# Patient Record
Sex: FEMALE | Race: BLACK OR AFRICAN AMERICAN | Employment: OTHER | URBAN - METROPOLITAN AREA
[De-identification: names, ages, dates, MRNs, and addresses within clinical notes are randomized per-mention and may not be internally consistent; named-entity substitution may affect disease eponyms.]

---

## 2017-09-20 ENCOUNTER — APPOINTMENT (OUTPATIENT)
Dept: GENERAL RADIOLOGY | Age: 82
End: 2017-09-20
Attending: FAMILY MEDICINE
Payer: MEDICARE

## 2017-09-20 ENCOUNTER — HOSPITAL ENCOUNTER (OUTPATIENT)
Age: 82
Setting detail: OBSERVATION
Discharge: HOME | End: 2017-09-23
Attending: FAMILY MEDICINE | Admitting: INTERNAL MEDICINE
Payer: MEDICARE

## 2017-09-20 DIAGNOSIS — R07.9 ACUTE CHEST PAIN: Primary | ICD-10-CM
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
## 2017-09-20 PROBLEM — S22.39XA RIB FRACTURE: Status: ACTIVE | Noted: 2017-09-20

## 2017-09-20 LAB
ANION GAP SERPL CALC-SCNC: 8 MMOL/L (ref 3–18)
BASOPHILS # BLD: 0 K/UL (ref 0–0.06)
BASOPHILS NFR BLD: 0 % (ref 0–2)
BNP SERPL-MCNC: 402 PG/ML (ref 0–1800)
BUN SERPL-MCNC: 11 MG/DL (ref 7–18)
BUN/CREAT SERPL: 11 (ref 12–20)
CALCIUM SERPL-MCNC: 9.4 MG/DL (ref 8.5–10.1)
CHLORIDE SERPL-SCNC: 101 MMOL/L (ref 100–108)
CK MB CFR SERPL CALC: 0.9 % (ref 0–4)
CK MB SERPL-MCNC: 1 NG/ML (ref 5–25)
CK SERPL-CCNC: 113 U/L (ref 26–192)
CO2 SERPL-SCNC: 28 MMOL/L (ref 21–32)
CREAT SERPL-MCNC: 1.01 MG/DL (ref 0.6–1.3)
DIFFERENTIAL METHOD BLD: ABNORMAL
EOSINOPHIL # BLD: 0.1 K/UL (ref 0–0.4)
EOSINOPHIL NFR BLD: 2 % (ref 0–5)
ERYTHROCYTE [DISTWIDTH] IN BLOOD BY AUTOMATED COUNT: 14.8 % (ref 11.6–14.5)
GLUCOSE BLD STRIP.AUTO-MCNC: 157 MG/DL (ref 70–110)
GLUCOSE BLD STRIP.AUTO-MCNC: 56 MG/DL (ref 70–110)
GLUCOSE BLD STRIP.AUTO-MCNC: 63 MG/DL (ref 70–110)
GLUCOSE SERPL-MCNC: 79 MG/DL (ref 74–99)
HCT VFR BLD AUTO: 38.9 % (ref 35–45)
HGB BLD-MCNC: 13.4 G/DL (ref 12–16)
INR PPP: 0.9 (ref 0.8–1.2)
LYMPHOCYTES # BLD: 2.4 K/UL (ref 0.9–3.6)
LYMPHOCYTES NFR BLD: 31 % (ref 21–52)
MCH RBC QN AUTO: 27.9 PG (ref 24–34)
MCHC RBC AUTO-ENTMCNC: 34.4 G/DL (ref 31–37)
MCV RBC AUTO: 81 FL (ref 74–97)
MONOCYTES # BLD: 0.7 K/UL (ref 0.05–1.2)
MONOCYTES NFR BLD: 9 % (ref 3–10)
NEUTS SEG # BLD: 4.3 K/UL (ref 1.8–8)
NEUTS SEG NFR BLD: 58 % (ref 40–73)
PLATELET # BLD AUTO: 230 K/UL (ref 135–420)
PMV BLD AUTO: 9.9 FL (ref 9.2–11.8)
POTASSIUM SERPL-SCNC: 3.2 MMOL/L (ref 3.5–5.5)
PROTHROMBIN TIME: 11.9 SEC (ref 11.5–15.2)
RBC # BLD AUTO: 4.8 M/UL (ref 4.2–5.3)
SODIUM SERPL-SCNC: 137 MMOL/L (ref 136–145)
TROPONIN I SERPL-MCNC: 0.14 NG/ML (ref 0–0.06)
WBC # BLD AUTO: 7.5 K/UL (ref 4.6–13.2)

## 2017-09-20 PROCEDURE — 74011250636 HC RX REV CODE- 250/636: Performed by: FAMILY MEDICINE

## 2017-09-20 PROCEDURE — 96374 THER/PROPH/DIAG INJ IV PUSH: CPT

## 2017-09-20 PROCEDURE — 80048 BASIC METABOLIC PNL TOTAL CA: CPT | Performed by: FAMILY MEDICINE

## 2017-09-20 PROCEDURE — 74011250637 HC RX REV CODE- 250/637: Performed by: INTERNAL MEDICINE

## 2017-09-20 PROCEDURE — 85610 PROTHROMBIN TIME: CPT | Performed by: FAMILY MEDICINE

## 2017-09-20 PROCEDURE — 74011250636 HC RX REV CODE- 250/636: Performed by: INTERNAL MEDICINE

## 2017-09-20 PROCEDURE — 82962 GLUCOSE BLOOD TEST: CPT

## 2017-09-20 PROCEDURE — 83880 ASSAY OF NATRIURETIC PEPTIDE: CPT | Performed by: FAMILY MEDICINE

## 2017-09-20 PROCEDURE — 74011000250 HC RX REV CODE- 250: Performed by: INTERNAL MEDICINE

## 2017-09-20 PROCEDURE — 96375 TX/PRO/DX INJ NEW DRUG ADDON: CPT

## 2017-09-20 PROCEDURE — 85025 COMPLETE CBC W/AUTO DIFF WBC: CPT | Performed by: FAMILY MEDICINE

## 2017-09-20 PROCEDURE — 71010 XR CHEST PORT: CPT

## 2017-09-20 PROCEDURE — 96372 THER/PROPH/DIAG INJ SC/IM: CPT

## 2017-09-20 PROCEDURE — 74011250637 HC RX REV CODE- 250/637: Performed by: FAMILY MEDICINE

## 2017-09-20 PROCEDURE — 82550 ASSAY OF CK (CPK): CPT | Performed by: FAMILY MEDICINE

## 2017-09-20 PROCEDURE — 93005 ELECTROCARDIOGRAM TRACING: CPT

## 2017-09-20 PROCEDURE — 99285 EMERGENCY DEPT VISIT HI MDM: CPT

## 2017-09-20 RX ORDER — MAGNESIUM SULFATE 100 %
4 CRYSTALS MISCELLANEOUS AS NEEDED
Status: DISCONTINUED | OUTPATIENT
Start: 2017-09-20 | End: 2017-09-23 | Stop reason: HOSPADM

## 2017-09-20 RX ORDER — METOLAZONE 5 MG/1
5 TABLET ORAL AS NEEDED
COMMUNITY

## 2017-09-20 RX ORDER — NAPROXEN 250 MG/1
500 TABLET ORAL 2 TIMES DAILY WITH MEALS
Status: DISCONTINUED | OUTPATIENT
Start: 2017-09-21 | End: 2017-09-23 | Stop reason: HOSPADM

## 2017-09-20 RX ORDER — TRAZODONE HYDROCHLORIDE 50 MG/1
25 TABLET ORAL ONCE
Status: COMPLETED | OUTPATIENT
Start: 2017-09-20 | End: 2017-09-20

## 2017-09-20 RX ORDER — SODIUM CHLORIDE 9 MG/ML
75 INJECTION, SOLUTION INTRAVENOUS CONTINUOUS
Status: DISPENSED | OUTPATIENT
Start: 2017-09-20 | End: 2017-09-21

## 2017-09-20 RX ORDER — ONDANSETRON 2 MG/ML
4 INJECTION INTRAMUSCULAR; INTRAVENOUS ONCE
Status: COMPLETED | OUTPATIENT
Start: 2017-09-20 | End: 2017-09-20

## 2017-09-20 RX ORDER — HEPARIN SODIUM 5000 [USP'U]/ML
5000 INJECTION, SOLUTION INTRAVENOUS; SUBCUTANEOUS EVERY 8 HOURS
Status: DISCONTINUED | OUTPATIENT
Start: 2017-09-20 | End: 2017-09-23 | Stop reason: HOSPADM

## 2017-09-20 RX ORDER — MORPHINE SULFATE 4 MG/ML
4 INJECTION, SOLUTION INTRAMUSCULAR; INTRAVENOUS
Status: COMPLETED | OUTPATIENT
Start: 2017-09-20 | End: 2017-09-20

## 2017-09-20 RX ORDER — MORPHINE SULFATE 2 MG/ML
2 INJECTION, SOLUTION INTRAMUSCULAR; INTRAVENOUS ONCE
Status: COMPLETED | OUTPATIENT
Start: 2017-09-20 | End: 2017-09-20

## 2017-09-20 RX ORDER — INSULIN GLARGINE 100 [IU]/ML
15 INJECTION, SOLUTION SUBCUTANEOUS
Status: DISCONTINUED | OUTPATIENT
Start: 2017-09-20 | End: 2017-09-21

## 2017-09-20 RX ORDER — POTASSIUM CHLORIDE 1.5 G/1.77G
20 POWDER, FOR SOLUTION ORAL 2 TIMES DAILY
COMMUNITY

## 2017-09-20 RX ORDER — ACETAMINOPHEN 325 MG/1
650 TABLET ORAL
Status: DISCONTINUED | OUTPATIENT
Start: 2017-09-20 | End: 2017-09-23 | Stop reason: HOSPADM

## 2017-09-20 RX ORDER — MECLIZINE HCL 12.5 MG 12.5 MG/1
12.5 TABLET ORAL
COMMUNITY

## 2017-09-20 RX ORDER — GUAIFENESIN 100 MG/5ML
243 LIQUID (ML) ORAL
Status: COMPLETED | OUTPATIENT
Start: 2017-09-20 | End: 2017-09-20

## 2017-09-20 RX ORDER — DEXTROSE 50 % IN WATER (D50W) INTRAVENOUS SYRINGE
25-50 AS NEEDED
Status: DISCONTINUED | OUTPATIENT
Start: 2017-09-20 | End: 2017-09-23 | Stop reason: HOSPADM

## 2017-09-20 RX ORDER — INSULIN LISPRO 100 [IU]/ML
INJECTION, SOLUTION INTRAVENOUS; SUBCUTANEOUS
Status: DISCONTINUED | OUTPATIENT
Start: 2017-09-20 | End: 2017-09-21

## 2017-09-20 RX ORDER — LIDOCAINE 50 MG/G
2 PATCH TOPICAL EVERY 24 HOURS
Status: DISCONTINUED | OUTPATIENT
Start: 2017-09-20 | End: 2017-09-23 | Stop reason: HOSPADM

## 2017-09-20 RX ADMIN — HEPARIN SODIUM 5000 UNITS: 5000 INJECTION, SOLUTION INTRAVENOUS; SUBCUTANEOUS at 22:44

## 2017-09-20 RX ADMIN — DEXTROSE MONOHYDRATE 12.5 G: 25 INJECTION, SOLUTION INTRAVENOUS at 23:07

## 2017-09-20 RX ADMIN — MORPHINE SULFATE 2 MG: 2 INJECTION, SOLUTION INTRAMUSCULAR; INTRAVENOUS at 19:32

## 2017-09-20 RX ADMIN — NITROGLYCERIN 1 INCH: 20 OINTMENT TOPICAL at 22:40

## 2017-09-20 RX ADMIN — TRAZODONE HYDROCHLORIDE 25 MG: 50 TABLET ORAL at 22:46

## 2017-09-20 RX ADMIN — SODIUM CHLORIDE 75 ML/HR: 900 INJECTION, SOLUTION INTRAVENOUS at 22:48

## 2017-09-20 RX ADMIN — Medication 4 MG: at 22:40

## 2017-09-20 RX ADMIN — ASPIRIN 81 MG 243 MG: 81 TABLET ORAL at 19:33

## 2017-09-20 RX ADMIN — ONDANSETRON 4 MG: 2 INJECTION INTRAMUSCULAR; INTRAVENOUS at 19:31

## 2017-09-20 NOTE — ED PROVIDER NOTES
[address]  EMERGENCY DEPARTMENT HISTORY AND PHYSICAL EXAM       Date: 9/20/2017   Patient Name: Bev Colunga   YOB: 1935  Medical Record Number: [id number]    History of Presenting Illness     Chief Complaint   Patient presents with    Rib Pain    Nausea    Chest Pain        History Provided By:  patient    Additional History: 7:10 PM   Bev Colunga is a 80 y.o. female with fractured left rib who presents to the emergency department C/O right sided rib pain onset after fall from syncopal episode one week ago while out of town. Associated sx's include nausea. Pt mentions passing out while in public restroom, no one in bathroom to witness. Pt was seen for pain in ER where she was vacationing and was diagnosed with left sided rib fracture. Per pt the Rx she was prescribed was of not help so she has been taking Tylenol. Pt has a pacemaker and Hx of bypass surgery. Pt is on ASA and beta blocker. Pt denies CP or any other sx's or complaints. Pt last had heart cath over one year ago at [facility].      Primary Care Provider: Stuart Cody MD   Specialist:    Past History     Past Medical History:   Past Medical History:   Diagnosis Date    CAD (coronary artery disease)     Diabetes ([address]     insulin dependent    Essential hypertension     Hypercholesterolemia     Menopause     Other and unspecified hyperlipidemia 1/9/2013    Thyroid disease         Past Surgical History:   Past Surgical History:   Procedure Laterality Date    BIOPSY BREAST      about 12 years ago    CHEST SURGERY PROCEDURE UNLISTED  2011    triple bypass    HX CORONARY ARTERY BYPASS GRAFT      HX HEART CATHETERIZATION      HX MOHS PROCEDURES  2002    right shoulder     LAMINECTOMY,LUMBAR  2001    3 disc - Dr. Alcides Lindsey        Family History:   Family History   Problem Relation Age of Onset    Heart Attack Mother         Social History:   Social History   Substance Use Topics    Smoking status: Former Smoker     Quit date: 10/3/1979    Smokeless tobacco: Never Used    Alcohol use Yes      Comment: maybe twice a month she has wine (red)        Allergies: Allergies   Allergen Reactions    Adhesive Rash    Demerol [Meperidine] Shortness of Breath     Pt has had fentanyl previously        Review of Systems   Review of Systems   Cardiovascular: Negative for chest pain. Gastrointestinal: Positive for nausea. Musculoskeletal: Positive for arthralgias. All other systems reviewed and are negative. Physical Exam  Vitals:    09/20/17 1945 09/20/17 2005 09/20/17 2030 09/20/17 2100   BP: 156/73 126/71 149/76 145/70   Pulse: 64 61 60 60   Resp: 20 16 20 24   Temp:       SpO2:       Weight:       Height:           Physical Exam   Nursing note and vitals reviewed. Vital signs and nursing notes reviewed    CONSTITUTIONAL: Alert, in no apparent distress; well-developed; well-nourished. Elderly, pleasant. HEAD:  Normocephalic, atraumatic  EYES: PERRL; EOM's intact. ENTM: Nose: no rhinorrhea; Throat: no erythema or exudate, mucous membranes moist  Neck:  No JVD, supple without lymphadenopathy  RESP: Chest clear, equal breath sounds. CV: S1 and S2 WNL; No murmurs, gallops or rubs. CHEST: Left lateral chest tenderness   GI: Normal bowel sounds, abdomen soft and non-tender. No masses or organomegaly. : No costo-vertebral angle tenderness. BACK:  Non-tender  UPPER EXT:  Normal inspection  LOWER EXT: No edema, no calf tenderness. Distal pulses intact. 1+ edema  NEURO: CN intact, reflexes 2/4 and sym, strength 5/5 and sym, sensation intact. SKIN: No rashes; Normal for age and stage. Sternotomy scar. Pacemaker left upper chest   PSYCH:  Alert and oriented, normal affect.      Diagnostic Study Results     Labs -      Recent Results (from the past 12 hour(s))   EKG, 12 LEAD, INITIAL    Collection Time: 09/20/17  7:18 PM   Result Value Ref Range    Ventricular Rate 63 BPM    Atrial Rate 63 BPM    P-R Interval 96 ms    QRS Duration 136 ms    Q-T Interval 466 ms    QTC Calculation (Bezet) 476 ms    Calculated R Axis -18 degrees    Calculated T Axis -28 degrees    Diagnosis       Electronic atrial pacemaker  Right bundle branch block  Inferior infarct (cited on or before 14-MAY-2013)  T wave abnormality, consider lateral ischemia  Abnormal ECG  When compared with ECG of 08-NOV-2014 16:47,  No significant change was found     CBC WITH AUTOMATED DIFF    Collection Time: 09/20/17  7:28 PM   Result Value Ref Range    WBC 7.5 4.6 - 13.2 K/uL    RBC 4.80 4.20 - 5.30 M/uL    HGB 13.4 12.0 - 16.0 g/dL    HCT 38.9 35.0 - 45.0 %    MCV 81.0 74.0 - 97.0 FL    MCH 27.9 24.0 - 34.0 PG    MCHC 34.4 31.0 - 37.0 g/dL    RDW 14.8 (H) 11.6 - 14.5 %    PLATELET 427 243 - 872 K/uL    MPV 9.9 9.2 - 11.8 FL    NEUTROPHILS 58 40 - 73 %    LYMPHOCYTES 31 21 - 52 %    MONOCYTES 9 3 - 10 %    EOSINOPHILS 2 0 - 5 %    BASOPHILS 0 0 - 2 %    ABS. NEUTROPHILS 4.3 1.8 - 8.0 K/UL    ABS. LYMPHOCYTES 2.4 0.9 - 3.6 K/UL    ABS. MONOCYTES 0.7 0.05 - 1.2 K/UL    ABS. EOSINOPHILS 0.1 0.0 - 0.4 K/UL    ABS.  BASOPHILS 0.0 0.0 - 0.06 K/UL    DF AUTOMATED     METABOLIC PANEL, BASIC    Collection Time: 09/20/17  7:28 PM   Result Value Ref Range    Sodium 137 136 - 145 mmol/L    Potassium 3.2 (L) 3.5 - 5.5 mmol/L    Chloride 101 100 - 108 mmol/L    CO2 28 21 - 32 mmol/L    Anion gap 8 3.0 - 18 mmol/L    Glucose 79 74 - 99 mg/dL    BUN 11 7.0 - 18 MG/DL    Creatinine 1.01 0.6 - 1.3 MG/DL    BUN/Creatinine ratio 11 (L) 12 - 20      GFR est AA >60 >60 ml/min/1.73m2    GFR est non-AA 53 (L) >60 ml/min/1.73m2    Calcium 9.4 8.5 - 10.1 MG/DL   PROTHROMBIN TIME + INR    Collection Time: 09/20/17  7:28 PM   Result Value Ref Range    Prothrombin time 11.9 11.5 - 15.2 sec    INR 0.9 0.8 - 1.2     NT-PRO BNP    Collection Time: 09/20/17  7:28 PM   Result Value Ref Range    NT pro- 0 - 1800 PG/ML   CARDIAC PANEL,(CK, CKMB & TROPONIN)    Collection Time: 09/20/17  7:28 PM   Result Value Ref Range     26 - 192 U/L    CK - MB 1.0 <3.6 ng/ml    CK-MB Index 0.9 0.0 - 4.0 %    Troponin-I, Qt. 0.14 (H) 0.00 - 0.06 NG/ML       Radiologic Studies -  The following have been ordered and reviewed:  XR CHEST PORT    (Results Pending)   9:28 PM  RADIOLOGY FINDINGS  chest X-ray shows NAP  Pending review by Radiologist  Recorded by Shlomo Ribeiro ED Scribe, as dictated by Jo Desai MD     Medical Decision Making   I am the first provider for this patient. I reviewed the vital signs, available nursing notes, past medical history, past surgical history, family history and social history. Ddx: ACS, plural effusion, atelectasis     Vital Signs-Reviewed the patient's vital signs. Patient Vitals for the past 12 hrs:   Temp Pulse Resp BP SpO2   09/20/17 2100 - 60 24 145/70 -   09/20/17 2030 - 60 20 149/76 -   09/20/17 2005 - 61 16 126/71 -   09/20/17 1945 - 64 20 156/73 -   09/20/17 1943 - 69 (!) 31 153/79 -   09/20/17 1852 98.6 °F (37 °C) 69 20 155/83 100 %       Pulse Oximetry Analysis - Normal 100% on RA     Cardiac Monitor:   Rate: 63 bpm  Rhythm: pacemaker     EKG interpretation: (Preliminary)  Rhythm: electronic atrial pacemaker. Rate (approx.): 63 bpm; RBBB   EKG read by Jo Desai MD at 19:18    [facility] Medical Records: [facility] which showed result of 10th rib fracture and stable EKG. 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 Procedures:   Procedures    ED Course:  7:10 PM  Initial assessment performed. The patients presenting problems have been discussed, and they are in agreement with the care plan formulated and outlined with them. I have encouraged them to ask questions as they arise throughout their visit.     9:28 PM Discussed patient's history, exam, and available diagnostics results with Sussy Kaba MD, hospitalist, who agree with admitting pt.     9:35 PM She still has right sided CP, vitals are stable. Will give morphine and apply nitro paste while moving towards admission. Medications Given in the ED:  Medications   morphine injection 2 mg (2 mg IntraVENous Given 9/20/17 1932)   ondansetron (ZOFRAN) injection 4 mg (4 mg IntraVENous Given 9/20/17 1931)   aspirin chewable tablet 243 mg (243 mg Oral Given 9/20/17 1933)       9:28 PM  Patient is being admitted to the hospital by Bertha Moscoso MD. The results of their tests and reasons for their admission have been discussed with them and/or available family. They convey agreement and understanding for the need to be admitted and for their admission diagnosis. CONDITIONS ON ADMISSION:  Deep Vein Thrombosis is not present at the time of admission. Thrombosis is not present at the time of admission. Urinary Tract Infection is not present at the time of admission. Pneumonia is not present at the time of admission. MRSA is not present at the time of admission. Wound infection is not present at the time of admission. Pressure Ulcer is not present at the time of admission. Discussion:  Pt presents with right sided CP. She had a syncopal episode last week broke 10 rib. Yesterday and today has had nausea and CP. She has coronary history with bypass. EKG was unremarkable and paced. CXR was stable. Troponin was 0.14 which is her baseline. Due to ongoing CP and her history will admit for CP rule out. Diagnosis   Clinical Impression:   1. Acute chest pain       _______________________________   Attestations: This note is prepared by Malissa Silva , acting as a Scribe for Nancy Pritchett MD on 7:10 PM on 9/20/2017 . Nancy Pritchett MD: The scribe's documentation has been prepared under my direction and personally reviewed by me in its entirety.   _______________________________

## 2017-09-20 NOTE — Clinical Note
Patient Class[de-identified] Observation [958] Type of Bed: Telemetry [19] Reason for Observation: chest pain rule out Admitting Diagnosis: Acute chest pain [[id number]] Admitting Physician: Anila Rocha [[id number]] Attending Physician: Anila Rocha [[id number]]

## 2017-09-20 NOTE — ED TRIAGE NOTES
Patient states that she sustained a fall on the 15th while on vacation. Patient with complaints of right rib pain. Patient states that she has been nauseated, last night she had chest pains. Sepsis Screening completed    (  )Patient meets SIRS criteria. (x  )Patient does not meet SIRS criteria.       SIRS Criteria is achieved when two or more of the following are present   Temperature < 96.8°F (36°C) or > 100.9°F (38.3°C)   Heart Rate > 90 beats per minute   Respiratory Rate > 20 breaths per minute   WBC count > 12,000 or <4,000 or > 10% bands

## 2017-09-20 NOTE — IP AVS SNAPSHOT
Drake Kate 
 
 
 [address] [facility] [address] 
[phone] Patient: Fern Gonzalez MRN: [id number] You are allergic to the following Allergen Reactions Adhesive Rash Demerol (Meperidine) Shortness of Breath Pt has had fentanyl previously Recent Documentation Height Weight BMI OB Status Smoking Status 1.702 m 85.1 kg 29.38 kg/m2 Postmenopausal Former Smoker Emergency Contacts Name Discharge Info Relation Home Work Mobile José Wright  Spouse [3] [phone] Juan Pina [3] [phone] About your hospitalization You were admitted on:  September 21, 2017 You last received care in the:  [address] [place] [address] You were discharged on:  September 23, 2017 Unit phone number:  [phone] Why you were hospitalized Your primary diagnosis was:  Chest Pain, Unspecified Your diagnoses also included:  Coronary Atherosclerosis Of Native Coronary Artery, Dm (Diabetes Mellitus) (Hcc), S/P Cabg (Coronary Artery Bypass Graft), Htn (Hypertension), Benign, A-Fib (Hcc), Cardiac Pacemaker In Situ, Elevated Troponin, Fall, Acute Chest Pain, Chest Pain, Rib Fracture, Carotid Stenosis, Right Providers Seen During Your Hospitalizations Provider Role Specialty Primary office phone Katherine Marino MD Attending Provider Emergency Medicine [phone] Gilda Sanchez MD Attending Provider Internal Medicine [phone] Your Primary Care Physician (PCP) Primary Care Physician Office Phone Office Fax Mely Sanabria [phone] Follow-up Information Follow up With Details Comments Contact Info Jaja Winkler MD In 1 week  [address] [facility] [address] [facility] 
[phone] 
  
 cardiology  In 1 week Dr. Shirley Otero  In 1 month Current Discharge Medication List  
 START taking these medications Dose & Instructions Dispensing Information Comments Morning Noon Evening Bedtime  
 lidocaine 5 % Commonly known as:  Dustin Hollis Apply patch to the affected area for 12 hours a day and remove for 12 hours a day. Quantity:  15 Each Refills:  0 CONTINUE these medications which have NOT CHANGED Dose & Instructions Dispensing Information Comments Morning Noon Evening Bedtime CENTRUM SILVER Tab tablet Generic drug:  multivitamins-minerals-lutein Your next dose is:  9/24/17 am  
   
 Dose:  1 Tab Take 1 Tab by mouth daily. Refills:  0  
     
  
   
   
   
  
 ECOTRIN LOW STRENGTH 81 mg tablet Generic drug:  aspirin delayed-release Your next dose is:  9/24/17 am  
   
 Dose:  81 mg Take 81 mg by mouth daily. Refills:  0  
     
  
   
   
   
  
 gabapentin 300 mg capsule Commonly known as:  NEURONTIN Your next dose is:  9/23/17 pm 
  
   
 Dose:  300 mg Take 1 capsule by mouth nightly. Quantity:  30 capsule Refills:  1 HYDROcodone-acetaminophen  mg tablet Commonly known as:  [address] [facility] Notes to Patient:  Take as directed Dose:  1 Tab Take 1 tablet by mouth every six (6) hours as needed for Pain. Quantity:  20 tablet Refills:  0  
     
   
   
   
  
 insulin lispro 100 unit/mL injection Commonly known as:  HUMALOG Your next dose is:  9/23/17 dinner Dose:  8 Units 8 Units by SubCUTAneous route Before breakfast, lunch, and dinner. Quantity:  1 vial  
Refills:  1  
     
   
   
  
   
  
 isosorbide mononitrate ER 30 mg tablet Commonly known as:  IMDUR Your next dose is:  9/24/17 am  
   
 Dose:  30 mg Take 1 Tab by mouth every morning. Quantity:  90 Tab Refills:  3 LANTUS 100 unit/mL injection Generic drug:  insulin glargine Your last dose was:  9/23/17 1230 pm 
  
 Your next dose is:  9/24/17 pm 
  
   
 Dose:  22 Units 22 Units by SubCUTAneous route nightly. Refills:  0  
     
   
   
   
  
  
 LASIX 40 mg tablet Generic drug:  furosemide Your next dose is:  9/24/17 am  
   
 Take  by mouth daily. Refills:  0  
     
  
   
   
   
  
 levothyroxine 125 mcg tablet Commonly known as:  SYNTHROID Your next dose is:  9/24/17 am  
   
 Take  by mouth Daily (before breakfast). Refills:  0  
     
  
   
   
   
  
 meclizine 12.5 mg tablet Commonly known as:  ANTIVERT Notes to Patient:  Take as directed Dose:  12.5 mg Take 12.5 mg by mouth three (3) times daily as needed. Refills:  0  
     
   
   
   
  
 metOLazone 5 mg tablet Commonly known as:  Allie Olive Your next dose is:  9/24/17 am  
   
 Dose:  5 mg Take 5 mg by mouth daily. Refills:  0  
     
  
   
   
   
  
 metoprolol succinate 100 mg tablet Commonly known as:  TOPROL XL Your next dose is:  9/24/17 am  
   
 Dose:  100 mg Take 1 Tab by mouth daily. Quantity:  90 Tab Refills:  3  
     
  
   
   
   
  
 potassium chloride 20 mEq packet Commonly known as:  KLOR-CON Your next dose is:  9/23/17 pm 
 
  
   
 Dose:  20 mEq Take 20 mEq by mouth two (2) times a day. Refills:  0  
     
  
   
   
  
   
  
 ranolazine ER 1,000 mg  
Commonly known as:  RANEXA Notes to Patient:  Patient no longer taking this medication Dose:  1000 mg Take 1 Tab by mouth two (2) times a day. Quantity:  180 Tab Refills:  3 Where to Get Your Medications These medications were sent to [facility], [address] [facility] [address] [place], [facility] [facility] [address] Phone:  [phone]  
  lidocaine 5 % Discharge Instructions DISCHARGE SUMMARY from Nurse The following personal items are in your possession at time of discharge: 
 
  
Visual Aid: Glasses, With patient PATIENT INSTRUCTIONS: 
 
 
F-face looks uneven A-arms unable to move or move unevenly S-speech slurred or non-existent T-time-call 911 as soon as signs and symptoms begin-DO NOT go Back to bed or wait to see if you get better-TIME IS BRAIN. Warning Signs of HEART ATTACK Call 911 if you have these symptoms: 
? Chest discomfort. Most heart attacks involve discomfort in the center of the chest that lasts more than a few minutes, or that goes away and comes back. It can feel like uncomfortable pressure, squeezing, fullness, or pain. ? Discomfort in other areas of the upper body. Symptoms can include pain or discomfort in one or both arms, the back, neck, jaw, or stomach. ? Shortness of breath with or without chest discomfort. ? Other signs may include breaking out in a cold sweat, nausea, or lightheadedness. Don't wait more than five minutes to call [address]! Fast action can save your life. Calling 911 is almost always the fastest way to get lifesaving treatment. Emergency Medical Services staff can begin treatment when they arrive  up to an hour sooner than if someone gets to the hospital by car. The discharge information has been reviewed with the patient. The patient verbalized understanding. Discharge medications reviewed with the patient and appropriate educational materials and side effects teaching were provided. Patient armband removed and shredded Lab Results Component Value Date/Time  Hemoglobin A1c 8.8 09/21/2017 04:30 AM  
 
 
This lab test reflects that your blood sugar has been slightly elevated over the past 3 months and should be evaluated by your primary care provider. An A1C of 5.7-6.4% meets the criteria for pre-diabetes; an A1C of 6.5% or higher meets the criteria for diabetes. This lab test reflects that your blood sugar averaged 206 mg/dL  over the past 3 months. It is important to follow up with your provider on a routine basis to continue to evaluate your blood sugar and discuss any necessary changes in treatment. Discharge Instructions Attachments/References ATRIAL FIBRILLATION (ENGLISH) CHEST PAIN (ENGLISH) DIABETES: FOOT CARE (ENGLISH) DIABETES DIET GUIDELINES: GENERAL INFO (ENGLISH) Discharge Orders None [employer] Announcement We are excited to announce that we are making your provider's discharge notes available to you in [employer]. You will see these notes when they are completed and signed by the physician that discharged you from your recent hospital stay. If you have any questions or concerns about any information you see in [employer], please call the Health Information Department where you were seen or reach out to your Primary Care Provider for more information about your plan of care. Introducing [facility] [place] [facility]! Dear Ramsey Chester: Thank you for requesting a [employer] account. Our records indicate that you already have an active [employer] account. You can access your account anytime at [email or web address] Did you know that you can access your hospital and ER discharge instructions at any time in [employer]? You can also review all of your test results from your hospital stay or ER visit. Additional Information If you have questions, please visit the Frequently Asked Questions section of the [employer] website at [email or web address] Remember, [employer] is NOT to be used for urgent needs. For medical emergencies, dial 911. Now available from your iPhone and Android! General Information Please provide this summary of care documentation to your next provider. Patient Signature:  ____________________________________________________________ Date:  ____________________________________________________________  
  
Meagan Barrera Provider Signature:  ____________________________________________________________ Date:  ____________________________________________________________ More Information Atrial Fibrillation: Care Instructions Your Care Instructions Atrial fibrillation is an irregular and often fast heartbeat. Treating this condition is important for several reasons. It can cause blood clots, which can travel from your heart to your brain and cause a stroke. If you have a fast heartbeat, you may feel lightheaded, dizzy, and weak. An irregular heartbeat can also increase your risk for heart failure. Atrial fibrillation is often the result of another heart condition, such as high blood pressure or coronary artery disease. Making changes to improve your heart condition will help you stay healthy and active. Follow-up care is a key part of your treatment and safety. Be sure to make and go to all appointments, and call your doctor if you are having problems. It's also a good idea to know your test results and keep a list of the medicines you take. How can you care for yourself at home? Medicines · Take your medicines exactly as prescribed. Call your doctor if you think you are having a problem with your medicine. You will get more details on the specific medicines your doctor prescribes. · If your doctor has given you a blood thinner to prevent a stroke, be sure you get instructions about how to take your medicine safely. Blood thinners can cause serious bleeding problems. · Do not take any vitamins, over-the-counter drugs, or herbal products without talking to your doctor first. 
Lifestyle changes · Do not smoke. Smoking can increase your chance of a stroke and heart attack. If you need help quitting, talk to your doctor about stop-smoking programs and medicines. These can increase your chances of quitting for good. · Eat a heart-healthy diet. · Stay at a healthy weight. Lose weight if you need to. · Limit alcohol to 2 drinks a day for men and 1 drink a day for women. Too much alcohol can cause health problems. · Avoid colds and flu. Get a pneumococcal vaccine shot. If you have had one before, ask your doctor whether you need another dose. Get a flu shot every year. If you must be around people with colds or flu, wash your hands often. Activity · If your doctor recommends it, get more exercise. Walking is a good choice. Bit by bit, increase the amount you walk every day. Try for at least 30 minutes on most days of the week. You also may want to swim, bike, or do other activities. Your doctor may suggest that you join a cardiac rehabilitation program so that you can have help increasing your physical activity safely. · Start light exercise if your doctor says it is okay. Even a small amount will help you get stronger, have more energy, and manage stress. Walking is an easy way to get exercise. Start out by walking a little more than you did in the hospital. Gradually increase the amount you walk. · When you exercise, watch for signs that your heart is working too hard. You are pushing too hard if you cannot talk while you are exercising. If you become short of breath or dizzy or have chest pain, sit down and rest immediately. · Check your pulse regularly. Place two fingers on the artery at the palm side of your wrist, in line with your thumb. If your heartbeat seems uneven or fast, talk to your doctor. When should you call for help? Call 911 anytime you think you may need emergency care. For example, call if: 
· You have symptoms of a heart attack. These may include: ¨ Chest pain or pressure, or a strange feeling in the chest. 
¨ Sweating. ¨ Shortness of breath. ¨ Nausea or vomiting. ¨ Pain, pressure, or a strange feeling in the back, neck, jaw, or upper belly or in one or both shoulders or arms. ¨ Lightheadedness or sudden weakness. ¨ A fast or irregular heartbeat. After you call 911, the  may tell you to chew 1 adult-strength or 2 to 4 low-dose aspirin. Wait for an ambulance. Do not try to drive yourself. · You have symptoms of a stroke. These may include: 
¨ Sudden numbness, tingling, weakness, or loss of movement in your face, arm, or leg, especially on only one side of your body. ¨ Sudden vision changes. ¨ Sudden trouble speaking. ¨ Sudden confusion or trouble understanding simple statements. ¨ Sudden problems with walking or balance. ¨ A sudden, severe headache that is different from past headaches. · You passed out (lost consciousness). Call your doctor now or seek immediate medical care if: 
· You have new or increased shortness of breath. · You feel dizzy or lightheaded, or you feel like you may faint. · Your heart rate becomes irregular. · You can feel your heart flutter in your chest or skip heartbeats. Tell your doctor if these symptoms are new or worse. Watch closely for changes in your health, and be sure to contact your doctor if you have any problems. Where can you learn more? Go to http://doris-jonathan.info/. Enter U020 in the search box to learn more about \"Atrial Fibrillation: Care Instructions. \" Current as of: September 21, 2016 Content Version: 11.3 © 3443-5376 [employer]digits. Care instructions adapted under license by [employer] (which disclaims liability or warranty for this information).  If you have questions about a medical condition or this instruction, always ask your healthcare professional. [address] any warranty or liability for your use of this information. Chest Pain: Care Instructions Your Care Instructions There are many things that can cause chest pain. Some are not serious and will get better on their own in a few days. But some kinds of chest pain need more testing and treatment. Your doctor may have recommended a follow-up visit in the next 8 to 12 hours. If you are not getting better, you may need more tests or treatment. Even though your doctor has released you, you still need to watch for any problems. The doctor carefully checked you, but sometimes problems can develop later. If you have new symptoms or if your symptoms do not get better, get medical care right away. If you have worse or different chest pain or pressure that lasts more than 5 minutes or you passed out (lost consciousness), call 911 or seek other emergency help right away. A medical visit is only one step in your treatment. Even if you feel better, you still need to do what your doctor recommends, such as going to all suggested follow-up appointments and taking medicines exactly as directed. This will help you recover and help prevent future problems. How can you care for yourself at home? · Rest until you feel better. · Take your medicine exactly as prescribed. Call your doctor if you think you are having a problem with your medicine. · Do not drive after taking a prescription pain medicine. When should you call for help? Call 911 if: 
· You passed out (lost consciousness). · You have severe difficulty breathing. · You have symptoms of a heart attack. These may include: ¨ Chest pain or pressure, or a strange feeling in your chest. 
¨ Sweating. ¨ Shortness of breath. ¨ Nausea or vomiting. ¨ Pain, pressure, or a strange feeling in your back, neck, jaw, or upper belly or in one or both shoulders or arms. ¨ Lightheadedness or sudden weakness. ¨ A fast or irregular heartbeat.  
After you call 911, the  may tell you to chew 1 adult-strength or 2 to 4 low-dose aspirin. Wait for an ambulance. Do not try to drive yourself. Call your doctor today if: 
· You have any trouble breathing. · Your chest pain gets worse. · You are dizzy or lightheaded, or you feel like you may faint. · You are not getting better as expected. · You are having new or different chest pain. Where can you learn more? Go to http://doris-jonathan.info/. Enter A120 in the search box to learn more about \"Chest Pain: Care Instructions. \" Current as of: March 20, 2017 Content Version: 11.3 © 8324-7356 [employer]. Care instructions adapted under license by [employer] (which disclaims liability or warranty for this information). If you have questions about a medical condition or this instruction, always ask your healthcare professional. [address] any warranty or liability for your use of this information. Diabetes Foot Health: Care Instructions Your Care Instructions When you have diabetes, your feet need extra care and attention. Diabetes can damage the nerve endings and blood vessels in your feet, making you less likely to notice when your feet are injured. Diabetes also limits your body's ability to fight infection and get blood to areas that need it. If you get a minor foot injury, it could become an ulcer or a serious infection. With good foot care, you can prevent most of these problems. Caring for your feet can be quick and easy. Most of the care can be done when you are bathing or getting ready for bed. Follow-up care is a key part of your treatment and safety. Be sure to make and go to all appointments, and call your doctor if you are having problems. Its also a good idea to know your test results and keep a list of the medicines you take. How can you care for yourself at home?  
· Keep your blood sugar close to normal by watching what and how much you eat, monitoring blood sugar, taking medicines if prescribed, and getting regular exercise. · Do not smoke. Smoking affects blood flow and can make foot problems worse. If you need help quitting, talk to your doctor about stop-smoking programs and medicines. These can increase your chances of quitting for good. · Eat a diet that is low in fats. High fat intake can cause fat to build up in your blood vessels and decrease blood flow. · Inspect your feet daily for blisters, cuts, cracks, or sores. If you cannot see well, use a mirror or have someone help you. · Take care of your feet: 
[facility] your feet every day. Use warm (not hot) water. Check the water temperature with your wrists or other part of your body, not your feet. ¨ Dry your feet well. Pat them dry. Do not rub the skin on your feet too hard. Dry well between your toes. If the skin on your feet stays moist, bacteria or a fungus can grow, which can lead to infection. ¨ Keep your skin soft. Use moisturizing skin cream to keep the skin on your feet soft and prevent calluses and cracks. But do not put the cream between your toes, and stop using any cream that causes a rash. ¨ Clean underneath your toenails carefully. Do not use a sharp object to clean underneath your toenails. Use the blunt end of a nail file or other rounded tool. ¨ Trim and file your toenails straight across to prevent ingrown toenails. Use a nail clipper, not scissors. Use an emery board to smooth the edges. · Change socks daily. Socks without seams are best, because seams often rub the feet. You can find socks for people with diabetes from specialty catalogs. · Look inside your shoes every day for things like gravel or torn linings, which could cause blisters or sores. · Buy shoes that fit well: 
¨ Look for shoes that have plenty of space around the toes. This helps prevent bunions and blisters. ¨ Try on shoes while wearing the kind of socks you will usually wear with the shoes. ¨ Avoid plastic shoes. They may rub your feet and cause blisters. Good shoes should be made of materials that are flexible and breathable, such as leather or cloth. ¨ Break in new shoes slowly by wearing them for no more than an hour a day for several days. Take extra time to check your feet for red areas, blisters, or other problems after you wear new shoes. · Do not go barefoot. Do not wear sandals, and do not wear shoes with very thin soles. Thin soles are easy to puncture. They also do not protect your feet from hot pavement or cold weather. · Have your doctor check your feet during each visit. If you have a foot problem, see your doctor. Do not try to treat an early foot problem at home. Home remedies or treatments that you can buy without a prescription (such as corn removers) can be harmful. · Always get early treatment for foot problems. A minor irritation can lead to a major problem if not properly cared for early. When should you call for help? Call your doctor now or seek immediate medical care if: 
· You have a foot sore, an ulcer or break in the skin that is not healing after 4 days, bleeding corns or calluses, or an ingrown toenail. · You have blue or black areas, which can mean bruising or blood flow problems. · You have peeling skin or tiny blisters between your toes or cracking or oozing of the skin. · You have a fever for more than 24 hours and a foot sore. · You have new numbness or tingling in your feet that does not go away after you move your feet or change positions. · You have unexplained or unusual swelling of the foot or ankle. Watch closely for changes in your health, and be sure to contact your doctor if: 
· You cannot do proper foot care. Where can you learn more? Go to http://doris-jonathan.info/. Enter A739 in the search box to learn more about \"Diabetes Foot Health: Care Instructions. \" Current as of: March 13, 2017 Content Version: 11.3 © 0200-4647 Healthwise[employer] Incorporated. Care instructions adapted under license by [employer] (which disclaims liability or warranty for this information). If you have questions about a medical condition or this instruction, always ask your healthcare professional. [address] any warranty or liability for your use of this information. Learning About Diabetes Food Guidelines Your Care Instructions Meal planning is important to manage diabetes. It helps keep your blood sugar at a target level (which you set with your doctor). You don't have to eat special foods. You can eat what your family eats, including sweets once in a while. But you do have to pay attention to how often you eat and how much you eat of certain foods. You may want to work with a dietitian or a certified diabetes educator (CDE) to help you plan meals and snacks. A dietitian or CDE can also help you lose weight if that is one of your goals. What should you know about eating carbs? Managing the amount of carbohydrate (carbs) you eat is an important part of healthy meals when you have diabetes. Carbohydrate is found in many foods. · Learn which foods have carbs. And learn the amounts of carbs in different foods. ¨ Bread, cereal, pasta, and rice have about 15 grams of carbs in a serving. A serving is 1 slice of bread (1 ounce), ½ cup of cooked cereal, or 1/3 cup of cooked pasta or rice. ¨ Fruits have 15 grams of carbs in a serving. A serving is 1 small fresh fruit, such as an apple or orange; ½ of a banana; ½ cup of cooked or canned fruit; ½ cup of fruit juice; 1 cup of melon or raspberries; or 2 tablespoons of dried fruit. ¨ Milk and no-sugar-added yogurt have 15 grams of carbs in a serving. A serving is 1 cup of milk or 2/3 cup of no-sugar-added yogurt. ¨ Starchy vegetables have 15 grams of carbs in a serving.  A serving is ½ cup of mashed potatoes or sweet potato; 1 cup winter squash; ½ of a small baked potato; ½ cup of cooked beans; or ½ cup cooked corn or green peas. · Learn how much carbs to eat each day and at each meal. A dietitian or CDE can teach you how to keep track of the amount of carbs you eat. This is called carbohydrate counting. · If you are not sure how to count carbohydrate grams, use the Plate Method to plan meals. It is a good, quick way to make sure that you have a balanced meal. It also helps you spread carbs throughout the day. ¨ Divide your plate by types of foods. Put non-starchy vegetables on half the plate, meat or other protein food on one-quarter of the plate, and a grain or starchy vegetable in the final quarter of the plate. To this you can add a small piece of fruit and 1 cup of milk or yogurt, depending on how many carbs you are supposed to eat at a meal. 
· Try to eat about the same amount of carbs at each meal. Do not \"save up\" your daily allowance of carbs to eat at one meal. 
· Proteins have very little or no carbs per serving. Examples of proteins are beef, chicken, turkey, fish, eggs, tofu, cheese, cottage cheese, and peanut butter. A serving size of meat is 3 ounces, which is about the size of a deck of cards. Examples of meat substitute serving sizes (equal to 1 ounce of meat) are 1/4 cup of cottage cheese, 1 egg, 1 tablespoon of peanut butter, and ½ cup of tofu. How can you eat out and still eat healthy? · Learn to estimate the serving sizes of foods that have carbohydrate. If you measure food at home, it will be easier to estimate the amount in a serving of restaurant food. · If the meal you order has too much carbohydrate (such as potatoes, corn, or baked beans), ask to have a low-carbohydrate food instead. Ask for a salad or green vegetables. · If you use insulin, check your blood sugar before and after eating out to help you plan how much to eat in the future. · If you eat more carbohydrate at a meal than you had planned, take a walk or do other exercise. This will help lower your blood sugar. What else should you know? · Limit saturated fat, such as the fat from meat and dairy products. This is a healthy choice because people who have diabetes are at higher risk of heart disease. So choose lean cuts of meat and nonfat or low-fat dairy products. Use olive or canola oil instead of butter or shortening when cooking. · Don't skip meals. Your blood sugar may drop too low if you skip meals and take insulin or certain medicines for diabetes. · Check with your doctor before you drink alcohol. Alcohol can cause your blood sugar to drop too low. Alcohol can also cause a bad reaction if you take certain diabetes medicines. Follow-up care is a key part of your treatment and safety. Be sure to make and go to all appointments, and call your doctor if you are having problems. It's also a good idea to know your test results and keep a list of the medicines you take. Where can you learn more? Go to http://doris-jonathan.info/. Enter G722 in the search box to learn more about \"Learning About Diabetes Food Guidelines. \" Current as of: March 13, 2017 Content Version: 11.3 © 4974-4831 [employer]. Care instructions adapted under license by [employer] (which disclaims liability or warranty for this information). If you have questions about a medical condition or this instruction, always ask your healthcare professional. [place] [address] any warranty or liability for your use of this information.

## 2017-09-21 LAB
ALBUMIN SERPL-MCNC: 3.1 G/DL (ref 3.4–5)
ALBUMIN/GLOB SERPL: 0.8 {RATIO} (ref 0.8–1.7)
ALP SERPL-CCNC: 124 U/L (ref 45–117)
ALT SERPL-CCNC: 14 U/L (ref 13–56)
ANION GAP SERPL CALC-SCNC: 8 MMOL/L (ref 3–18)
AST SERPL-CCNC: 22 U/L (ref 15–37)
BILIRUB SERPL-MCNC: 0.4 MG/DL (ref 0.2–1)
BUN SERPL-MCNC: 12 MG/DL (ref 7–18)
BUN/CREAT SERPL: 13 (ref 12–20)
CALCIUM SERPL-MCNC: 8.9 MG/DL (ref 8.5–10.1)
CHLORIDE SERPL-SCNC: 97 MMOL/L (ref 100–108)
CK MB CFR SERPL CALC: 1.1 % (ref 0–4)
CK MB CFR SERPL CALC: 1.4 % (ref 0–4)
CK MB SERPL-MCNC: 1.1 NG/ML (ref 5–25)
CK MB SERPL-MCNC: 1.2 NG/ML (ref 5–25)
CK SERPL-CCNC: 103 U/L (ref 26–192)
CK SERPL-CCNC: 85 U/L (ref 26–192)
CO2 SERPL-SCNC: 26 MMOL/L (ref 21–32)
CREAT SERPL-MCNC: 0.9 MG/DL (ref 0.6–1.3)
EST. AVERAGE GLUCOSE BLD GHB EST-MCNC: 206 MG/DL
GLOBULIN SER CALC-MCNC: 4 G/DL (ref 2–4)
GLUCOSE BLD STRIP.AUTO-MCNC: 168 MG/DL (ref 70–110)
GLUCOSE BLD STRIP.AUTO-MCNC: 284 MG/DL (ref 70–110)
GLUCOSE BLD STRIP.AUTO-MCNC: 297 MG/DL (ref 70–110)
GLUCOSE BLD STRIP.AUTO-MCNC: 320 MG/DL (ref 70–110)
GLUCOSE BLD STRIP.AUTO-MCNC: 353 MG/DL (ref 70–110)
GLUCOSE SERPL-MCNC: 171 MG/DL (ref 74–99)
HBA1C MFR BLD: 8.8 % (ref 4.5–5.6)
MAGNESIUM SERPL-MCNC: 1.8 MG/DL (ref 1.6–2.6)
POTASSIUM SERPL-SCNC: 3.3 MMOL/L (ref 3.5–5.5)
PROT SERPL-MCNC: 7.1 G/DL (ref 6.4–8.2)
SODIUM SERPL-SCNC: 131 MMOL/L (ref 136–145)
TROPONIN I SERPL-MCNC: 0.08 NG/ML (ref 0–0.06)
TROPONIN I SERPL-MCNC: 0.12 NG/ML (ref 0–0.06)

## 2017-09-21 PROCEDURE — 83036 HEMOGLOBIN GLYCOSYLATED A1C: CPT | Performed by: INTERNAL MEDICINE

## 2017-09-21 PROCEDURE — 74011250637 HC RX REV CODE- 250/637: Performed by: HOSPITALIST

## 2017-09-21 PROCEDURE — 96375 TX/PRO/DX INJ NEW DRUG ADDON: CPT

## 2017-09-21 PROCEDURE — 96372 THER/PROPH/DIAG INJ SC/IM: CPT

## 2017-09-21 PROCEDURE — 74011636637 HC RX REV CODE- 636/637: Performed by: INTERNAL MEDICINE

## 2017-09-21 PROCEDURE — 99218 HC RM OBSERVATION: CPT

## 2017-09-21 PROCEDURE — 74011250637 HC RX REV CODE- 250/637: Performed by: INTERNAL MEDICINE

## 2017-09-21 PROCEDURE — 93005 ELECTROCARDIOGRAM TRACING: CPT

## 2017-09-21 PROCEDURE — 82550 ASSAY OF CK (CPK): CPT | Performed by: INTERNAL MEDICINE

## 2017-09-21 PROCEDURE — 83735 ASSAY OF MAGNESIUM: CPT | Performed by: INTERNAL MEDICINE

## 2017-09-21 PROCEDURE — 36415 COLL VENOUS BLD VENIPUNCTURE: CPT | Performed by: INTERNAL MEDICINE

## 2017-09-21 PROCEDURE — 93306 TTE W/DOPPLER COMPLETE: CPT

## 2017-09-21 PROCEDURE — 74011636637 HC RX REV CODE- 636/637: Performed by: HOSPITALIST

## 2017-09-21 PROCEDURE — 82962 GLUCOSE BLOOD TEST: CPT

## 2017-09-21 PROCEDURE — 80053 COMPREHEN METABOLIC PANEL: CPT | Performed by: INTERNAL MEDICINE

## 2017-09-21 PROCEDURE — 93880 EXTRACRANIAL BILAT STUDY: CPT

## 2017-09-21 PROCEDURE — 74011250636 HC RX REV CODE- 250/636: Performed by: INTERNAL MEDICINE

## 2017-09-21 RX ORDER — INSULIN LISPRO 100 [IU]/ML
INJECTION, SOLUTION INTRAVENOUS; SUBCUTANEOUS
Status: DISCONTINUED | OUTPATIENT
Start: 2017-09-21 | End: 2017-09-22

## 2017-09-21 RX ORDER — ASPIRIN 81 MG/1
81 TABLET ORAL DAILY
Status: DISCONTINUED | OUTPATIENT
Start: 2017-09-21 | End: 2017-09-23 | Stop reason: HOSPADM

## 2017-09-21 RX ORDER — MECLIZINE HCL 12.5 MG 12.5 MG/1
12.5 TABLET ORAL
Status: DISCONTINUED | OUTPATIENT
Start: 2017-09-21 | End: 2017-09-23 | Stop reason: HOSPADM

## 2017-09-21 RX ORDER — RANOLAZINE 500 MG/1
1000 TABLET, EXTENDED RELEASE ORAL 2 TIMES DAILY
Status: DISCONTINUED | OUTPATIENT
Start: 2017-09-21 | End: 2017-09-22

## 2017-09-21 RX ORDER — GABAPENTIN 300 MG/1
300 CAPSULE ORAL
Status: DISCONTINUED | OUTPATIENT
Start: 2017-09-21 | End: 2017-09-23 | Stop reason: HOSPADM

## 2017-09-21 RX ORDER — HYDROMORPHONE HYDROCHLORIDE 2 MG/ML
1 INJECTION, SOLUTION INTRAMUSCULAR; INTRAVENOUS; SUBCUTANEOUS
Status: DISCONTINUED | OUTPATIENT
Start: 2017-09-21 | End: 2017-09-21

## 2017-09-21 RX ORDER — METOPROLOL SUCCINATE 100 MG/1
100 TABLET, EXTENDED RELEASE ORAL DAILY
Status: DISCONTINUED | OUTPATIENT
Start: 2017-09-21 | End: 2017-09-23 | Stop reason: HOSPADM

## 2017-09-21 RX ORDER — ISOSORBIDE MONONITRATE 30 MG/1
30 TABLET, EXTENDED RELEASE ORAL
Status: DISCONTINUED | OUTPATIENT
Start: 2017-09-21 | End: 2017-09-23 | Stop reason: HOSPADM

## 2017-09-21 RX ORDER — FUROSEMIDE 40 MG/1
40 TABLET ORAL DAILY
Status: DISCONTINUED | OUTPATIENT
Start: 2017-09-21 | End: 2017-09-21

## 2017-09-21 RX ORDER — FUROSEMIDE 40 MG/1
80 TABLET ORAL DAILY
Status: DISCONTINUED | OUTPATIENT
Start: 2017-09-21 | End: 2017-09-23 | Stop reason: HOSPADM

## 2017-09-21 RX ORDER — LEVOTHYROXINE SODIUM 125 UG/1
125 TABLET ORAL
Status: DISCONTINUED | OUTPATIENT
Start: 2017-09-21 | End: 2017-09-23 | Stop reason: HOSPADM

## 2017-09-21 RX ORDER — POTASSIUM CHLORIDE 1.5 G/1.77G
20 POWDER, FOR SOLUTION ORAL 2 TIMES DAILY
Status: COMPLETED | OUTPATIENT
Start: 2017-09-21 | End: 2017-09-21

## 2017-09-21 RX ORDER — METOLAZONE 5 MG/1
5 TABLET ORAL DAILY
Status: DISCONTINUED | OUTPATIENT
Start: 2017-09-21 | End: 2017-09-21

## 2017-09-21 RX ORDER — INSULIN GLARGINE 100 [IU]/ML
15 INJECTION, SOLUTION SUBCUTANEOUS
Status: DISCONTINUED | OUTPATIENT
Start: 2017-09-21 | End: 2017-09-22

## 2017-09-21 RX ADMIN — INSULIN LISPRO 6 UNITS: 100 INJECTION, SOLUTION INTRAVENOUS; SUBCUTANEOUS at 07:55

## 2017-09-21 RX ADMIN — MULTIPLE VITAMINS W/ MINERALS TAB 1 TABLET: TAB at 14:47

## 2017-09-21 RX ADMIN — ISOSORBIDE MONONITRATE 30 MG: 30 TABLET, EXTENDED RELEASE ORAL at 10:56

## 2017-09-21 RX ADMIN — POTASSIUM CHLORIDE 20 MEQ: 1.5 POWDER, FOR SOLUTION ORAL at 21:47

## 2017-09-21 RX ADMIN — HEPARIN SODIUM 5000 UNITS: 5000 INJECTION, SOLUTION INTRAVENOUS; SUBCUTANEOUS at 14:47

## 2017-09-21 RX ADMIN — FUROSEMIDE 80 MG: 40 TABLET ORAL at 14:47

## 2017-09-21 RX ADMIN — HYDROMORPHONE HYDROCHLORIDE 1 MG: 2 INJECTION, SOLUTION INTRAMUSCULAR; INTRAVENOUS; SUBCUTANEOUS at 01:54

## 2017-09-21 RX ADMIN — NAPROXEN 500 MG: 250 TABLET ORAL at 10:57

## 2017-09-21 RX ADMIN — INSULIN GLARGINE 15 UNITS: 100 INJECTION, SOLUTION SUBCUTANEOUS at 17:30

## 2017-09-21 RX ADMIN — POTASSIUM CHLORIDE 20 MEQ: 1.5 POWDER, FOR SOLUTION ORAL at 14:47

## 2017-09-21 RX ADMIN — METOLAZONE 5 MG: 5 TABLET ORAL at 14:47

## 2017-09-21 RX ADMIN — ACETAMINOPHEN 650 MG: 325 TABLET ORAL at 21:47

## 2017-09-21 RX ADMIN — MYCOPHENOLATE MOFETIL 300 MG: 500 TABLET ORAL at 21:47

## 2017-09-21 RX ADMIN — NAPROXEN 500 MG: 250 TABLET ORAL at 17:30

## 2017-09-21 RX ADMIN — HEPARIN SODIUM 5000 UNITS: 5000 INJECTION, SOLUTION INTRAVENOUS; SUBCUTANEOUS at 21:46

## 2017-09-21 RX ADMIN — INSULIN LISPRO 10 UNITS: 100 INJECTION, SOLUTION INTRAVENOUS; SUBCUTANEOUS at 17:30

## 2017-09-21 RX ADMIN — METOPROLOL SUCCINATE 100 MG: 100 TABLET, EXTENDED RELEASE ORAL at 14:47

## 2017-09-21 RX ADMIN — LEVOTHYROXINE SODIUM 125 MCG: 125 TABLET ORAL at 10:57

## 2017-09-21 RX ADMIN — RANOLAZINE 1000 MG: 500 TABLET, FILM COATED, EXTENDED RELEASE ORAL at 14:47

## 2017-09-21 RX ADMIN — ASPIRIN 81 MG: 81 TABLET, COATED ORAL at 14:47

## 2017-09-21 RX ADMIN — HEPARIN SODIUM 5000 UNITS: 5000 INJECTION, SOLUTION INTRAVENOUS; SUBCUTANEOUS at 06:27

## 2017-09-21 RX ADMIN — INSULIN LISPRO 12 UNITS: 100 INJECTION, SOLUTION INTRAVENOUS; SUBCUTANEOUS at 21:46

## 2017-09-21 NOTE — H&P
History & Physical    Patient: Sohail Aldrich MRN: [id number]  CSN: [id number]    YOB: 1935  Age: 80 y.o. Sex: female      DOA: 9/20/2017  Primary Care Provider:  Candance Opal, MD      Assessment/Plan     Patient Active Problem List   Diagnosis Code    Coronary atherosclerosis of native coronary artery I25.10    DM (diabetes mellitus) ([address] E11.9    S/P CABG (coronary artery bypass graft) Z95.1    Chest pain, unspecified R07.9    HTN (hypertension), benign I10    Other and unspecified hyperlipidemia E78.5    A-fib ([address] I48.91    Sick sinus syndrome ([address] I49.5    Cardiac pacemaker in situ Z95.0    Pubic bone fracture ([address] S32.509A    Ileus ([address] K56.7    Elevated troponin R74.8    Fall W19. XXXA    Acute chest pain R07.9    Chest pain R07.9       1. Atypical Chest pain, Cardiac vs MSK  2. CAD s/p CABG  3. SSS pacemaker in place   4. Elevated Trop, at baseline looking at the records   5. Subacute 10th left sided rib pain   6. HTN  7. Syncope, last week. No more episodes sicne   8. DM2  9. GERD  FULL CODE     -admit for further care due to cardiac risk   -will trend CE, check tsh, check 2D echo and carotid US  -gentle hydration, consult cardiology in am   -lidocaine patch and naproxen for rib pain for now   -will give one time trazadone to help her sleep at this time   -had pacemaker interrogated last week. -accuchecks, ISS, lantus 15units at bedside. Diabetic team consult   -supportive care   -bedrest, fall precaution        CC: chest pain        HPI:     Sohail Aldrich is a 80 y.o. female who comes to the ED with complaints of chest pain. Patient states she was out of town last week and when she went to use a public rest room she felt \"overwhelmed and lightheaded\" and slowly passed out in the bathroom. She thinks she fell on the fell side without any head trauma. She think she passed out for about 20 mins but its very unclear.  Her  took her home because she felt ok after that. But the following day she started having left sided chest pain so she went to the closest ER. She was evaluated with CXR and pacemaker interrogation. She was noted to have acute left 10th rib fx. It was conservatively management and she was discharged. She has been taking tylenol since. She did complain of tight chest pain throughout last week and than over last two days she think her pain has been rotating between right and the left side. Due to the pain, her appetite has been poor as well. She states she has been feeling overwhelmed for past 3-4 weeks for which she saw her Primary Cardiologist, Dr Aly Argueta, who placed her on Holter monitor. Results of which are still pending according to her. In the ED she was noted to have slightly elevated trop but looks at the past, they are at her baseline. She was given morphine which didn't help with her pain. She continues to complaint of on/off right and left sided chest pain.          Past Medical History:   Diagnosis Date    CAD (coronary artery disease)     Diabetes ([address]     insulin dependent    Essential hypertension     Hypercholesterolemia     Menopause     Other and unspecified hyperlipidemia 1/9/2013    Thyroid disease        Past Surgical History:   Procedure Laterality Date    BIOPSY BREAST      about 12 years ago    CHEST SURGERY PROCEDURE UNLISTED  2011    triple bypass    HX CORONARY ARTERY BYPASS GRAFT      HX HEART CATHETERIZATION      HX MOHS PROCEDURES  2002    right shoulder     LAMINECTOMY,LUMBAR  2001    3 disc - Dr. Olman Adorno       Family History   Problem Relation Age of Onset    Heart Attack Mother        Social History     Social History    Marital status:      Spouse name: N/A    Number of children: N/A    Years of education: N/A     Social History Main Topics    Smoking status: Former Smoker     Quit date: 10/3/1979    Smokeless tobacco: Never Used    Alcohol use Yes      Comment: maybe twice a month she has wine (red)    Drug use: No    Sexual activity: Not Asked     Other Topics Concern    None     Social History Narrative       Prior to Admission medications    Medication Sig Start Date End Date Taking? Authorizing Provider   potassium chloride (KLOR-CON) 20 mEq packet Take 20 mEq by mouth two (2) times a day. Yes Adeline Abernathy MD   metOLazone (ZAROXOLYN) 5 mg tablet Take 5 mg by mouth daily. Yes Adeline Abernathy MD   meclizine (ANTIVERT) 12.5 mg tablet Take 12.5 mg by mouth three (3) times daily as needed. Yes Adeline Abernathy MD   isosorbide mononitrate ER (IMDUR) 30 mg tablet Take 1 Tab by mouth every morning. 6/19/14  Yes Evangelista Caro MD   metoprolol succinate (TOPROL XL) 100 mg XL tablet Take 1 Tab by mouth daily. 5/2/14  Yes Chani Smith NP   furosemide (LASIX) 40 mg tablet Take  by mouth daily. Yes Historical Provider   gabapentin (NEURONTIN) 300 mg capsule Take 1 capsule by mouth nightly. 11/13/14   Narcisa Aldrich MD   insulin lispro (HUMALOG) 100 unit/mL injection 8 Units by SubCUTAneous route Before breakfast, lunch, and dinner. 11/13/14   Narcisa Aldrich MD   hydrocodone-acetaminophen [facility])  mg tablet Take 1 tablet by mouth every six (6) hours as needed for Pain. 9/26/14   Madelyn Ardon MD   Ranolazine (RANEXA) 1,000 mg Tb12 Take 1 Tab by mouth two (2) times a day. 5/6/14   Chani Smith NP   levothyroxine (SYNTHROID) 125 mcg tablet Take  by mouth Daily (before breakfast). Historical Provider   multivitamins-minerals-lutein (CENTRUM SILVER) Tab Take 1 Tab by mouth daily. Historical Provider   insulin glargine (LANTUS) 100 unit/mL injection 18 Units by SubCUTAneous route nightly. Historical Provider   aspirin delayed-release (ECOTRIN LOW STRENGTH) 81 mg tablet Take 81 mg by mouth daily.     Historical Provider       Allergies   Allergen Reactions    Adhesive Rash    Demerol [Meperidine] Shortness of Breath     Pt has had fentanyl previously       Review of Systems  Gen: No fever, chills, malaise, weight loss/gain. Heent: No headache, rhinorrhea, epistaxis, ear pain, hearing loss, sinus pain, neck pain/stiffness, sore throat. Heart: No  palpitations, QUIROGA, pnd, or orthopnea. Resp: No cough, hemoptysis, wheezing and shortness of breath. GI: No nausea, vomiting, diarrhea, constipation, melena or hematochezia. : No urinary obstruction, dysuria or hematuria. Derm: No rash, new skin lesion or pruritis. Musc/skeletal: no bone or joint complains. Vasc: No edema, cyanosis or claudication. Endo: No heat/cold intolerance, no polyuria,polydipsia or polyphagia. Neuro: No unilateral weakness, numbness, tingling. No seizures. Heme: No easy bruising or bleeding. Physical Exam:     Physical Exam:  Visit Vitals    /70    Pulse 60    Temp 98.6 °F (37 °C)    Resp 24    Ht 5' 7\" (1.702 m)    Wt 84.4 kg (186 lb)    SpO2 100%    BMI 29.13 kg/m2      O2 Device: Room air    Temp (24hrs), Av.6 °F (37 °C), Min:98.6 °F (37 °C), Max:98.6 °F (37 °C)             General:  Awake, cooperative, no distress. Head:  Normocephalic, without obvious abnormality, atraumatic. Eyes:  Conjunctivae/corneas clear, sclera anicteric, PERRL, EOMs intact. Nose: Nares normal. No drainage or sinus tenderness. Throat: Lips, mucosa, and tongue normal.    Neck: Supple, symmetrical, trachea midline, no adenopathy. Lungs:   Clear to auscultation bilaterally. Heart:  Regular rate and rhythm, S1, S2 normal, no murmur, click, rub or gallop. Abdomen: Soft, non-tender. Bowel sounds normal. No masses,  No organomegaly. Extremities: Extremities normal, atraumatic, no cyanosis or edema. Capillary refill normal.   Pulses: 2+ and symmetric all extremities. Skin: Skin color pink/pale/mottled/flushed, turgor normal. No rashes or lesions   Neurologic: CNII-XII intact. No focal motor or sensory deficit.    Slight tenderness to palpation to both side of the chest wall.     Labs Reviewed:      Recent Results (from the past 24 hour(s))   EKG, 12 LEAD, INITIAL    Collection Time: 09/20/17  7:18 PM   Result Value Ref Range    Ventricular Rate 63 BPM    Atrial Rate 63 BPM    P-R Interval 96 ms    QRS Duration 136 ms    Q-T Interval 466 ms    QTC Calculation (Bezet) 476 ms    Calculated R Axis -18 degrees    Calculated T Axis -28 degrees    Diagnosis       Electronic atrial pacemaker  Right bundle branch block  Inferior infarct (cited on or before 14-MAY-2013)  T wave abnormality, consider lateral ischemia  Abnormal ECG  When compared with ECG of 08-NOV-2014 16:47,  No significant change was found     CBC WITH AUTOMATED DIFF    Collection Time: 09/20/17  7:28 PM   Result Value Ref Range    WBC 7.5 4.6 - 13.2 K/uL    RBC 4.80 4.20 - 5.30 M/uL    HGB 13.4 12.0 - 16.0 g/dL    HCT 38.9 35.0 - 45.0 %    MCV 81.0 74.0 - 97.0 FL    MCH 27.9 24.0 - 34.0 PG    MCHC 34.4 31.0 - 37.0 g/dL    RDW 14.8 (H) 11.6 - 14.5 %    PLATELET 044 132 - 177 K/uL    MPV 9.9 9.2 - 11.8 FL    NEUTROPHILS 58 40 - 73 %    LYMPHOCYTES 31 21 - 52 %    MONOCYTES 9 3 - 10 %    EOSINOPHILS 2 0 - 5 %    BASOPHILS 0 0 - 2 %    ABS. NEUTROPHILS 4.3 1.8 - 8.0 K/UL    ABS. LYMPHOCYTES 2.4 0.9 - 3.6 K/UL    ABS. MONOCYTES 0.7 0.05 - 1.2 K/UL    ABS. EOSINOPHILS 0.1 0.0 - 0.4 K/UL    ABS.  BASOPHILS 0.0 0.0 - 0.06 K/UL    DF AUTOMATED     METABOLIC PANEL, BASIC    Collection Time: 09/20/17  7:28 PM   Result Value Ref Range    Sodium 137 136 - 145 mmol/L    Potassium 3.2 (L) 3.5 - 5.5 mmol/L    Chloride 101 100 - 108 mmol/L    CO2 28 21 - 32 mmol/L    Anion gap 8 3.0 - 18 mmol/L    Glucose 79 74 - 99 mg/dL    BUN 11 7.0 - 18 MG/DL    Creatinine 1.01 0.6 - 1.3 MG/DL    BUN/Creatinine ratio 11 (L) 12 - 20      GFR est AA >60 >60 ml/min/1.73m2    GFR est non-AA 53 (L) >60 ml/min/1.73m2    Calcium 9.4 8.5 - 10.1 MG/DL   PROTHROMBIN TIME + INR    Collection Time: 09/20/17  7:28 PM   Result Value Ref Range    Prothrombin time 11.9 11.5 - 15.2 sec    INR 0.9 0.8 - 1.2     NT-PRO BNP    Collection Time: 09/20/17  7:28 PM   Result Value Ref Range    NT pro- 0 - 1800 PG/ML   CARDIAC PANEL,(CK, CKMB & TROPONIN)    Collection Time: 09/20/17  7:28 PM   Result Value Ref Range     26 - 192 U/L    CK - MB 1.0 <3.6 ng/ml    CK-MB Index 0.9 0.0 - 4.0 %    Troponin-I, Qt. 0.14 (H) 0.00 - 0.06 NG/ML       Procedures/imaging: see electronic medical records for all procedures/Xrays and details which were not copied into this note but were reviewed prior to creation of Plan    50 minutes of  care time spent in the direct evaluation and treatment of this high risk patient.      CC: Bandar Tirado MD

## 2017-09-21 NOTE — ED NOTES
Patient's blood sugar 63, provided with milk to drink, repeated blood sugar 56, provided with apple sauce to eat and another container of milk to drink. Receiving RN Jess Inder aware.  Will recheck blood sugar

## 2017-09-21 NOTE — PROGRESS NOTES
Patient was visited by NOÉ. Volunteer followed up with patient and/or family and reported no needs to this . Chaplains will continue to follow and will provide pastoral care as needed or requested. [address], M.Div.   Board Certified   [phone] - Office

## 2017-09-21 NOTE — ED NOTES
Patient reports breast pain on left side that started last night and reports sharp pains in right side on way into the ED. Patient also reports SOB at times, lungs clear, NAD. Pt tachypneic at times when pain comes.

## 2017-09-21 NOTE — ROUTINE PROCESS
TRANSFER - OUT REPORT:    Verbal and bedside report given to Tamra Brush RN (name) on Yareli Mode  being transferred to [facility] (unit) for routine progression of care       Report consisted of patients Situation, Background, Assessment and   Recommendations(SBAR). Information from the following report(s) SBAR, Kardex, ED Summary, MAR, Recent Results and Cardiac Rhythm Paced was reviewed with the receiving nurse. Lines:   Peripheral IV 09/20/17 Right Antecubital (Active)   Site Assessment Clean, dry, & intact 9/20/2017  7:28 PM   Phlebitis Assessment 0 9/20/2017  7:28 PM   Infiltration Assessment 0 9/20/2017  7:28 PM   Dressing Status Clean, dry, & intact 9/20/2017  7:28 PM   Dressing Type Transparent 9/20/2017  7:28 PM   Hub Color/Line Status Patent; Flushed 9/20/2017  7:28 PM   Action Taken Blood drawn 9/20/2017  7:28 PM        Opportunity for questions and clarification was provided.       Patient transported with:   Monitor  Tech

## 2017-09-21 NOTE — PROGRESS NOTES
2340: Assumed patient care, she was alert and oriented to person, place, time and situation. Respiratory status was stable on room air. Vital signs were stable. MEWS score was a one. Patient denied any nausea vomiting dizziness or anxiety. White board was updated and explained. Bed was locked and in lowest position. Call bell, water and personal belongings were within reach. Patient had no questions, comments or concerns after bedside shift report. 0700: Patient had an uneventful shift. Respiratory status, vital signs and MEWS score remained stable. Patient was resting quietly with no signs of distress noted. Bed locked and in lowest position. Call bell water and personal belongings were within reach. Patient had no questions, comments or concerns after bedside shift report. Bedside report given to RITU Lopez R.N.

## 2017-09-21 NOTE — PROGRESS NOTES
Readmission Risk Assessment:     Moderate Risk and MSSP/Good Help ACO patients    RRAT Score:  13-20    Initial Assessment:chart reviewed and visited with patient at bedside,pt came to ED because of recent fall while on vacation and shortly started experiencing radiating arm and chest discomfort,pt lives at home with ,active and independent with care,at this time pt under observation,cm did E-Mail UR for updated status,pt remains obs,discharge pending cardiology consult,cm will cont to review and remain available if needed. Emergency Contact:spouse      Pertinent Medical Hx:     See chart    PCP/Specialists: Cas Mccoy:       DME:     none     Moderate Risk Care Transition Plan:  1. Evaluate for [place] or H2H, SNF, acute rehab, community care coordination of resources. 2. Involve patient/caregiver in assessment, planning, education and implement of intervention. 3. CM daily patient care huddles/interdisciplinary rounds. 4. PCP/Specialist appointment within 5  7 days made prior to discharge. 5. Facilitate transportation and logistics for follow-up appointments. 6. Medication reconciliation [address]  7. Formal handoff between hospital provider and post-acute provider to transition patient  Handoff to [facility] Nurse Navigator or PCP practice.

## 2017-09-21 NOTE — PROGRESS NOTES
conducted an initial consultation and Spiritual Assessment for Ronda Wild, who is a 80 y.o.,female. Patients Primary Language is: [place]. According to the patients EMR Taoism Affiliation is: Temple.  talked to  who was in the room waiting for   the wife to come back from a procedure downstairs. The reason the Patient came to the hospital is:   Patient Active Problem List    Diagnosis Date Noted   Dustin Valeam 09/20/2017    Acute chest pain 09/20/2017    Chest pain 09/20/2017    Rib fracture 09/20/2017    Ileus ([address] 11/11/2014    Elevated troponin 11/11/2014    Pubic bone [facility] 11/08/2014    Cardiac pacemaker in situ 06/20/2014    Sick sinus syndrome ([facility] 09/18/2013    A-fib ([facility] 02/27/2013    Other and unspecified hyperlipidemia 01/09/2013    S/P CABG (coronary artery bypass graft) 12/12/2012    Chest pain, unspecified 12/12/2012    HTN (hypertension), benign 12/12/2012    Coronary atherosclerosis of native coronary artery 08/24/2012    DM (diabetes mellitus) ([facility] 08/24/2012        The  provided the following Interventions:  Initiated a relationship of care and support. Explored issues of sonya, belief, spirituality and Denominational/ritual needs while hospitalized. Listened empathically. Provided chaplaincy education. Provided information about Spiritual Care Services. Offered prayer and assurance of continued prayers on patients behalf. Chart reviewed. The following outcomes were achieved:  Patient shared limited information about both their medical narrative and spiritual journey/beliefs. Patient processed feeling about current hospitalization. Patient expressed gratitude for pastoral care visit. Assessment:  Patient does not have any Denominational/cultural needs that will affect patients preferences in health care. There are no further spiritual or Denominational issues which require intervention at this time. Plan:  Chaplains will continue to follow and will provide pastoral care on an as needed/requested basis.  recommends bedside caregivers page  on duty if patient shows signs of acute spiritual or emotional distress.       Sister Alee Saleem, [facility], [address] [place] [address]  [phone]

## 2017-09-21 NOTE — DIABETES MGMT
GLYCEMIC CONTROL PROGRESS NOTE:    -discussed in rounds, known h/o T2DM, basal/bolus + SSI home regimen  -BG out of target range non-ICU: < 140 mg/dL requiring 6 units corrective coverage  -basal + corrective coverage orders in place, recommend continue, monitor BG trends overnight for the need for mealtime insulin  Noted:  -pt with hypoglycemic episode yesterday possibly r/t lack of PO intake, protocol followed    Recent Glucose Results:   Lab Results   Component Value Date/Time     (H) 09/21/2017 04:40 AM    GLU 79 09/20/2017 07:28 PM    GLUCPOC 297 (H) 09/21/2017 11:02 AM    GLUCPOC 284 (H) 09/21/2017 07:03 AM    GLUCPOC 168 (H) 09/21/2017 04:32 AM       Nate Moreland RN, MS  Glycemic Control Team

## 2017-09-21 NOTE — PROCEDURES
[facility]  *** FINAL REPORT ***    Name: Wolf Ratliff  MRN: [id number]    Inpatient  : 1935  HIS Order #: [id number]  [id number] [facility] Visit #: [id number]  Date: 21 Sep 2017    TYPE OF TEST: Cerebrovascular Duplex    REASON FOR TEST  Transient ischemic attacks    Right Carotid:-             Proximal               Mid                 Distal  cm/s  Systolic  Diastolic  Systolic  Diastolic  Systolic  Diastolic  CCA:    180.2      13.0       81.0      14.0       58.0      16.0  Bulb:  ECA:    255.0       0.0  ICA:    106.0      40.0      150.0      43.0       66.0      18.0  ICA/CCA:  1.4       3.3    ICA Stenosis: 50-69%    Right Vertebral:-  Finding: Antegrade  Sys:       50.0  Michelle:       12.0    Right Subclavian: Normal    Left Carotid:-            Proximal                Mid                 Distal  cm/s  Systolic  Diastolic  Systolic  Diastolic  Systolic  Diastolic  CCA:    118.5       7.0       72.0       9.0       62.0      10.0  Bulb:  ECA:    100.0       0.0  ICA:     47.0      11.0       66.0      23.0       66.0      20.0  ICA/CCA:  0.3       3.3    ICA Stenosis: <50%    Left Vertebral:-  Finding: Antegrade  Sys:       58.0  Michelle:       16.0    Left Subclavian: Normal    INTERPRETATION/FINDINGS  Duplex images were obtained using 2-D gray scale, color flow, and  spectral Doppler analysis. 1. 50-69% stenosis in the right internal carotid artery by velocity  criteria  2. <50% stenosis in the left internal carotid artery. 3. Probable hemodynamically significant stenosis in the right external   carotid artery. 4. No significant stenosis in the left external carotid artery. 5. Antegrade flow in both vertebral arteries. 6. Normal flow in both subclavian arteries. Plaque Morphology:  1. Hyperechoic plaque in the bulb and right ICA. 2. Hyperechoic plaque in the bulb and left ICA.     ADDITIONAL COMMENTS  No significant change from the previous exam done on 10/11    I have personally reviewed the data relevant to the interpretation of  this  study.     TECHNOLOGIST: Briana Galvan  Signed: 09/21/2017 01:43 PM    PHYSICIAN: Gerda Boeck MD  Signed: 09/21/2017 03:43 PM

## 2017-09-21 NOTE — PROGRESS NOTES
Hospitalist Progress Note-critical care note     Patient: Sin Greer MRN: [id number]  CSN: [id number]    YOB: 1935  Age: 80 y.o. Sex: female    DOA: 9/20/2017 LOS:  LOS: 0 days            Chief complaint: chest pain,rib chest,  Htn, DM     Assessment/Plan         Hospital Problems  Date Reviewed: 9/20/2017          Codes Class Noted POA    Fall ICD-10-CM: W19. [place]  ICD-9-CM: E888.9  9/20/2017 Unknown        Acute chest pain ICD-10-CM: R07.9  ICD-9-CM: 786.50  9/20/2017 Unknown        Chest pain ICD-10-CM: R07.9  ICD-9-CM: 786.50  9/20/2017 Unknown        Rib fracture ICD-10-CM: S22.39XA  ICD-9-CM: 807.00  9/20/2017 Unknown        Elevated troponin ICD-10-CM: R74.8  ICD-9-CM: 790.6  11/11/2014 Yes        Cardiac pacemaker in situ ICD-10-CM: Z95.0  ICD-9-CM: V45.01  6/20/2014 Yes        A-fib ([facility] ICD-10-CM: I48.91  ICD-9-CM: 427.31  2/27/2013 Yes        S/P CABG (coronary artery bypass graft) ICD-10-CM: Z95.1  ICD-9-CM: V45.81  12/12/2012 Yes        * (Principal)Chest pain, unspecified ICD-10-CM: R07.9  ICD-9-CM: 786.50  12/12/2012 Yes        HTN (hypertension), benign ICD-10-CM: I10  ICD-9-CM: 401.1  12/12/2012 Yes        Coronary atherosclerosis of native coronary artery ICD-10-CM: I25.10  ICD-9-CM: 414.01  8/24/2012 Yes        DM (diabetes mellitus) ([facility] [address] (Chronic) ICD-10-CM: E11.9  ICD-9-CM: 250.00  8/24/2012 Yes            1. Atypical Chest pain, Cardiac vs MSK  Cardiology was on board, 0.14-0.08-0.12  No chest pain now, on nitro patch  Continue home meds ,bbb abnd ranexa   2. CAD s/p CABG  Continue home meds   3. SSS pacemaker in place   4. Elevated Trop, at baseline looking at the records   5. Subacute 10th left sided rib pain   Pain control   6. HTN  Restart home meds   7. Syncope, last week. No more episodes sicne   Will have carotid ultrasound   8. DM2  lantus and ssi   9.  GERD  ppi  10 hypokalemia   K replacement     Subjective: no chest pain, need my home meds   Nurse : no acute issue     Potential d.c tomorrow if cardiology clear. Review of systems:    General: No fevers or chills. Cardiovascular: No chest pain or pressure. No palpitations. Pulmonary: No shortness of breath. Gastrointestinal: No nausea, vomiting. Vital signs/Intake and Output:  Visit Vitals    /64 (BP 1 Location: Left arm, BP Patient Position: At rest)    Pulse 63    Temp 98.1 °F (36.7 °C)    Resp 18    Ht 5' 7\" (1.702 m)    Wt 84.4 kg (186 lb)    SpO2 97%    BMI 29.13 kg/m2     Current Shift:  09/21 0701 - 09/21 1900  In: 120 [P.O.:120]  Out: -   Last three shifts:       Physical Exam:  General: WD, WN. Alert, cooperative, no acute distress    HEENT: NC, Atraumatic. PERRLA, anicteric sclerae. Lungs: CTA Bilaterally. No Wheezing/Rhonchi/Rales. Heart:  Regular  rhythm,  + murmur, No Rubs, No Gallops  Abdomen: Soft, Non distended, Non tender.  +Bowel sounds,   Extremities: No c/c/e  Psych:   Not anxious or agitated. Neurologic:  No acute neurological deficit.              Labs: Results:       Chemistry Recent Labs      09/21/17 0440  09/20/17 1928   GLU  171*  79   NA  131*  137   K  3.3*  3.2*   CL  97*  101   CO2  26  28   BUN  12  11   CREA  0.90  1.01   CA  8.9  9.4   AGAP  8  8   BUCR  13  11*   AP  124*   --    TP  7.1   --    ALB  3.1*   --    GLOB  4.0   --    AGRAT  0.8   --       CBC w/Diff Recent Labs      09/20/17 1928   WBC  7.5   RBC  4.80   HGB  13.4   HCT  38.9   PLT  230   GRANS  58   LYMPH  31   EOS  2      Cardiac Enzymes Recent Labs      09/21/17   1230  09/21/17 0440   CPK  85  103   CKND1  1.4  1.1      Coagulation Recent Labs      09/20/17 1928   PTP  11.9   INR  0.9       Lipid Panel Lab Results   Component Value Date/Time    Cholesterol, total 149 06/06/2013 09:06 AM    HDL Cholesterol 69 06/06/2013 09:06 AM    LDL, calculated 66 06/06/2013 09:06 AM    VLDL, calculated 14 06/06/2013 09:06 AM    Triglyceride 71 06/06/2013 09:06 AM    CHOL/HDL Ratio 2.6 02/28/2013 04:36 AM      BNP No results for input(s): BNPP in the last 72 hours.    Liver Enzymes Recent Labs      09/21/17   0440   TP  7.1   ALB  3.1*   AP  124*   SGOT  22      Thyroid Studies Lab Results   Component Value Date/Time    TSH 0.548 05/06/2013 10:36 AM        Procedures/imaging: see electronic medical records for all procedures/Xrays and details which were not copied into this note but were reviewed prior to creation of Roro Hurtado MD

## 2017-09-21 NOTE — PROGRESS NOTES
Problem: Falls - Risk of  Goal: *Absence of Falls  Document Eligio Fall Risk and appropriate interventions in the flowsheet.    Outcome: Progressing Towards Goal  Fall Risk Interventions:  Mobility Interventions: Bed/chair exit alarm, Patient to call before getting OOB, Utilize walker, cane, or other assitive device           Medication Interventions: Patient to call before getting OOB, Teach patient to arise slowly           History of Falls Interventions: Bed/chair exit alarm, Door open when patient unattended, Investigate reason for fall, Room close to nurse's station

## 2017-09-22 ENCOUNTER — HOME HEALTH ADMISSION (OUTPATIENT)
Dept: HOME HEALTH SERVICES | Facility: HOME HEALTH | Age: 82
End: 2017-09-22
Payer: MEDICARE

## 2017-09-22 PROBLEM — I65.21 CAROTID STENOSIS, RIGHT: Status: ACTIVE | Noted: 2017-09-22

## 2017-09-22 LAB
GLUCOSE BLD STRIP.AUTO-MCNC: 125 MG/DL (ref 70–110)
GLUCOSE BLD STRIP.AUTO-MCNC: 156 MG/DL (ref 70–110)
GLUCOSE BLD STRIP.AUTO-MCNC: 194 MG/DL (ref 70–110)
GLUCOSE BLD STRIP.AUTO-MCNC: 268 MG/DL (ref 70–110)
GLUCOSE BLD STRIP.AUTO-MCNC: 315 MG/DL (ref 70–110)
GLUCOSE BLD STRIP.AUTO-MCNC: 33 MG/DL (ref 70–110)
GLUCOSE BLD STRIP.AUTO-MCNC: 348 MG/DL (ref 70–110)

## 2017-09-22 PROCEDURE — 96376 TX/PRO/DX INJ SAME DRUG ADON: CPT

## 2017-09-22 PROCEDURE — 99218 HC RM OBSERVATION: CPT

## 2017-09-22 PROCEDURE — 74011250637 HC RX REV CODE- 250/637: Performed by: HOSPITALIST

## 2017-09-22 PROCEDURE — 74011250636 HC RX REV CODE- 250/636: Performed by: INTERNAL MEDICINE

## 2017-09-22 PROCEDURE — 74011636637 HC RX REV CODE- 636/637: Performed by: HOSPITALIST

## 2017-09-22 PROCEDURE — 96372 THER/PROPH/DIAG INJ SC/IM: CPT

## 2017-09-22 PROCEDURE — 74011000250 HC RX REV CODE- 250: Performed by: INTERNAL MEDICINE

## 2017-09-22 PROCEDURE — 74011250637 HC RX REV CODE- 250/637: Performed by: INTERNAL MEDICINE

## 2017-09-22 PROCEDURE — 82962 GLUCOSE BLOOD TEST: CPT

## 2017-09-22 RX ORDER — INSULIN GLARGINE 100 [IU]/ML
15 INJECTION, SOLUTION SUBCUTANEOUS
Status: DISCONTINUED | OUTPATIENT
Start: 2017-09-22 | End: 2017-09-23 | Stop reason: HOSPADM

## 2017-09-22 RX ORDER — INSULIN LISPRO 100 [IU]/ML
INJECTION, SOLUTION INTRAVENOUS; SUBCUTANEOUS
Status: DISCONTINUED | OUTPATIENT
Start: 2017-09-22 | End: 2017-09-23 | Stop reason: HOSPADM

## 2017-09-22 RX ORDER — INSULIN LISPRO 100 [IU]/ML
3 INJECTION, SOLUTION INTRAVENOUS; SUBCUTANEOUS
Status: DISCONTINUED | OUTPATIENT
Start: 2017-09-22 | End: 2017-09-23 | Stop reason: HOSPADM

## 2017-09-22 RX ORDER — INSULIN LISPRO 100 [IU]/ML
INJECTION, SOLUTION INTRAVENOUS; SUBCUTANEOUS
Status: DISCONTINUED | OUTPATIENT
Start: 2017-09-22 | End: 2017-09-22

## 2017-09-22 RX ADMIN — ISOSORBIDE MONONITRATE 30 MG: 30 TABLET, EXTENDED RELEASE ORAL at 06:45

## 2017-09-22 RX ADMIN — METOPROLOL SUCCINATE 100 MG: 100 TABLET, EXTENDED RELEASE ORAL at 10:10

## 2017-09-22 RX ADMIN — HEPARIN SODIUM 5000 UNITS: 5000 INJECTION, SOLUTION INTRAVENOUS; SUBCUTANEOUS at 21:36

## 2017-09-22 RX ADMIN — MYCOPHENOLATE MOFETIL 300 MG: 500 TABLET ORAL at 21:00

## 2017-09-22 RX ADMIN — INSULIN LISPRO 3 UNITS: 100 INJECTION, SOLUTION INTRAVENOUS; SUBCUTANEOUS at 17:02

## 2017-09-22 RX ADMIN — DEXTROSE MONOHYDRATE 25 G: 25 INJECTION, SOLUTION INTRAVENOUS at 06:38

## 2017-09-22 RX ADMIN — FUROSEMIDE 80 MG: 40 TABLET ORAL at 10:11

## 2017-09-22 RX ADMIN — MULTIPLE VITAMINS W/ MINERALS TAB 1 TABLET: TAB at 10:10

## 2017-09-22 RX ADMIN — HEPARIN SODIUM 5000 UNITS: 5000 INJECTION, SOLUTION INTRAVENOUS; SUBCUTANEOUS at 06:44

## 2017-09-22 RX ADMIN — INSULIN LISPRO 6 UNITS: 100 INJECTION, SOLUTION INTRAVENOUS; SUBCUTANEOUS at 12:41

## 2017-09-22 RX ADMIN — NAPROXEN 500 MG: 250 TABLET ORAL at 10:10

## 2017-09-22 RX ADMIN — INSULIN GLARGINE 15 UNITS: 100 INJECTION, SOLUTION SUBCUTANEOUS at 12:41

## 2017-09-22 RX ADMIN — INSULIN LISPRO 8 UNITS: 100 INJECTION, SOLUTION INTRAVENOUS; SUBCUTANEOUS at 17:02

## 2017-09-22 RX ADMIN — NAPROXEN 500 MG: 250 TABLET ORAL at 17:03

## 2017-09-22 RX ADMIN — ASPIRIN 81 MG: 81 TABLET, COATED ORAL at 10:11

## 2017-09-22 RX ADMIN — LEVOTHYROXINE SODIUM 125 MCG: 125 TABLET ORAL at 06:45

## 2017-09-22 NOTE — PROGRESS NOTES
0700- Assumed care. Report received. Notes, orders, labs, meds and plan of care reviewed. Pt currently off floor for testing. 1200-  Pt returned from vascular. Denies pain or complaint. Resting comfortably in bed. VS stable. 1600- Pt in bed. Denies pain or complaint. VS stable. 1900-  Report to oncoming nurse given. No interval change. Pt now c/o mild left sided rib pain. Oncoming nurse aware.

## 2017-09-22 NOTE — PROGRESS NOTES
Hospitalist Progress Note-critical care note     Patient: Bj Browning MRN: [id number]  CSN: [id number]    YOB: 1935  Age: 80 y.o. Sex: female    DOA: 9/20/2017 LOS:  LOS: 0 days            Chief complaint: chest pain,rib chest,  Htn, DM     Assessment/Plan         Hospital Problems  Date Reviewed: 9/20/2017          Codes Class Noted POA    Fall ICD-10-CM: W19. [place] Americo  ICD-9-CM: E888.9  9/20/2017 Unknown        Acute chest pain ICD-10-CM: R07.9  ICD-9-CM: 786.50  9/20/2017 Unknown        Chest pain ICD-10-CM: R07.9  ICD-9-CM: 786.50  9/20/2017 Unknown        Rib fracture ICD-10-CM: S22.39XA  ICD-9-CM: 807.00  9/20/2017 Unknown        Elevated troponin ICD-10-CM: R74.8  ICD-9-CM: 790.6  11/11/2014 Yes        Cardiac pacemaker in situ ICD-10-CM: Z95.0  ICD-9-CM: V45.01  6/20/2014 Yes        A-fib ([facility] ICD-10-CM: I48.91  ICD-9-CM: 427.31  2/27/2013 Yes        S/P CABG (coronary artery bypass graft) ICD-10-CM: Z95.1  ICD-9-CM: V45.81  12/12/2012 Yes        * (Principal)Chest pain, unspecified ICD-10-CM: R07.9  ICD-9-CM: 786.50  12/12/2012 Yes        HTN (hypertension), benign ICD-10-CM: I10  ICD-9-CM: 401.1  12/12/2012 Yes        Coronary atherosclerosis of native coronary artery ICD-10-CM: I25.10  ICD-9-CM: 414.01  8/24/2012 Yes        DM (diabetes mellitus) ([facility] [address] (Chronic) ICD-10-CM: E11.9  ICD-9-CM: 250.00  8/24/2012 Yes            1. Atypical Chest pain, Cardiac vs MSK  Cardiology was on board, 0.14-0.08-0.12  No chest pain overnight   Continue home meds ,bbb abnd ranexa   Case discussed with dr. Alex Martinez   2. CAD s/p CABG  Continue home meds   3. SSS pacemaker in place   4. Elevated Trop, at baseline looking at the records   5. Subacute 10th left sided rib pain   Pain control   6. HTN  Restart home meds   7. Syncope, last week. No more episodes sicne then   Will have carotid ultrasound : ZURDO stenosis   Case discussed with Dr. Davion Boudreaux, he will see pt   8.  DM2  Hypoglycemia   lantus and change ssi to tid with meal   9. GERD  ppi  10 hypokalemia   K replacement     Subjective: no chest pain,   Nurse : no acute issue     Potential d.c tomorrow if no further procedure per vascular/cardiology     Review of systems:    General: No fevers or chills. Cardiovascular: No chest pain or pressure. No palpitations. Pulmonary: No shortness of breath. Gastrointestinal: No nausea, vomiting. Vital signs/Intake and Output:  Visit Vitals    /72 (BP 1 Location: Left arm, BP Patient Position: At rest)    Pulse 67    Temp 98.2 °F (36.8 °C)    Resp 19    Ht 5' 7\" (1.702 m)    Wt 87.2 kg (192 lb 3.9 oz)    SpO2 97%    BMI 30.11 kg/m2     Current Shift:     Last three shifts:  09/20 1901 - 09/22 0700  In: 1350 [P.O.:360; I.V.:990]  Out: -     Physical Exam:  General: WD, WN. Alert, cooperative, no acute distress    HEENT: NC, Atraumatic. PERRLA, anicteric sclerae. Lungs: CTA Bilaterally. No Wheezing/Rhonchi/Rales. Heart:  Regular  rhythm,  + murmur, No Rubs, No Gallops  Abdomen: Soft, Non distended, Non tender.  +Bowel sounds,   Extremities: No c/c/e  Psych:   Not anxious or agitated. Neurologic:  No acute neurological deficit.              Labs: Results:       Chemistry Recent Labs      09/21/17 0440  09/20/17 1928   GLU  171*  79   NA  131*  137   K  3.3*  3.2*   CL  97*  101   CO2  26  28   BUN  12  11   CREA  0.90  1.01   CA  8.9  9.4   AGAP  8  8   BUCR  13  11*   AP  124*   --    TP  7.1   --    ALB  3.1*   --    GLOB  4.0   --    AGRAT  0.8   --       CBC w/Diff Recent Labs      09/20/17 1928   WBC  7.5   RBC  4.80   HGB  13.4   HCT  38.9   PLT  230   GRANS  58   LYMPH  31   EOS  2      Cardiac Enzymes Recent Labs      09/21/17   1230  09/21/17 0440   CPK  85  103   CKND1  1.4  1.1      Coagulation Recent Labs      09/20/17 1928   PTP  11.9   INR  0.9       Lipid Panel Lab Results   Component Value Date/Time    Cholesterol, total 149 06/06/2013 09:06 AM    HDL Cholesterol 69 06/06/2013 09:06 AM    LDL, calculated 66 06/06/2013 09:06 AM    VLDL, calculated 14 06/06/2013 09:06 AM    Triglyceride 71 06/06/2013 09:06 AM    CHOL/HDL Ratio 2.6 02/28/2013 04:36 AM      BNP No results for input(s): BNPP in the last 72 hours.    Liver Enzymes Recent Labs      09/21/17   0440   TP  7.1   ALB  3.1*   AP  124*   SGOT  22      Thyroid Studies Lab Results   Component Value Date/Time    TSH 0.548 05/06/2013 10:36 AM        Procedures/imaging: see electronic medical records for all procedures/Xrays and details which were not copied into this note but were reviewed prior to creation of Donavan Blizzard, MD

## 2017-09-22 NOTE — CONSULTS
[address] [place] [address] Rd    Name:  Reshma Kinsey  MR#:  [id number]  :  1935  Account #:  [de-identified]  Date of Adm:  2017  Date of Consultation:  2017        PRIMARY CARDIOLOGIST: Rodney Chacko MD      HISTORY OF PRESENT ILLNESS: This patient is a 28-year-old  female who comes to the hospital with a complaint of chest pain. She  was doing fine and a week ago when she fell down in a restroom, felt  lightheadedness and slowly passed out in the bathroom, according to  the patient, and then later, she was diagnosed with fracture of the left  10th rib and the chest pain was somewhat different than the angina  pain and then the pain started migrating and the patient became  worried and came to the hospital. From a rib fracture standpoint, she is  being treated conservatively and denied any fever, cough, or pleuritic  chest pain, denied any trauma, denied any anginal pain. No risk factor  for DVT or pulmonary embolism. The patient has been following with  Dr. Delmy De La Rosa and she has been having echocardiogram, stress test, and  the last cardiac catheterization was done in 2015, that was  without any new intervention. PAST MEDICAL HISTORY: Significant for CAD, coronary artery  bypass surgery, status post permanent pacemaker for sick sinus  syndrome, hyperlipidemia, hypertension, obesity, thyroid disorder. SURGICAL HISTORY: Reviewed and discussed with the patient. ALLERGIES:  1. ADHESIVES. 2. DEMEROL. FAMILY HISTORY: Positive from a CAD standpoint on the mother's  side. HOME MEDICATIONS: Reviewed and discussed, which include:  1. Isosorbide 30 mg daily. 2. Toprol- mg daily. 3. Antivert 12.5 mg daily. 4. Zaroxolyn 5 mg daily. 5. Potassium chloride. 6. Insulin lispro. 7. Norco.  8. Ranolazine or Ranexa. 9. Levothyroxine. 10. Multivitamins. 11. Aspirin.     REVIEW OF SYSTEMS: A 10-point review of systems  completed, positive pertinent finding discussed in the history of present  illness. The rest of the systems are negative. PHYSICAL EXAMINATION:  GENERAL: The patient is comfortable, not in any distress, not using  any accessory muscles of respiration. VITAL SIGNS: Temperature is 98.2, heart rate is 67, respiration is 19,  blood pressure is 132/72, pulse oximetry is 97%. HEENT: Head is atraumatic, normocephalic. NECK: Supple. No JVD, no carotid bruits. HEART: S1, S2 audible. LUNGS: Bilateral air entry positive, no added sound. ABDOMEN: Soft, nontender. Bowel sounds audible. EXTREMITIES: No edema. Pulses are palpable. NEUROLOGIC: No focal motor or sensory deficit. DERMATOLOGICAL: No skin rash. LABORATORY DATA: EKG is atrial paced rhythm with right bundle  branch block, old inferior MI changes. Cardiac panels: Troponin was  0.014, 0.08, and 0.12 with a normal CK-MB and BNP was 402. The  echocardiogram has shown ejection fraction of 60% with grade 1  diastolic dysfunction. RV function was normal. There was mild MR  also. PA pressure was 37 mmHg. ASSESSMENT AND PLAN:  1. Atypical chest pain after a fall with rib fracture. 2. Minimal troponin elevation. 3. Coronary artery disease. 4. Hypertension. 5. Coronary artery bypass surgery. 6. Dyslipidemia. 7. Diabetes mellitus. 8. Hypertension. RECOMMENDATIONS: At this point, the patient had a normal  echocardiogram with normal ejection fraction without any new wall  motion abnormality. The patient's last cardiac catheterization was done  in 2015 that revealed a stable graft with occluded left circumflex artery  and severe native vessel artery disease with preserved LV function  and at this point, I have recommended continue with medical  treatment. The patient can have a nuclear stress test done in the  outpatient setting. The patient may be able to discharge home  tomorrow if remains stable. Treatment plan was discussed with the  patient, .         Jose Pike MD    MA / Sherie Del Cid  D:  09/22/2017   15:27  T:  09/22/2017   16:18  Job #:  [id number]

## 2017-09-22 NOTE — PROGRESS NOTES
1915:  Bedside and Verbal shift change report received from Alee Castillo RN (offgoing nurse) to Nan Soto RN (oncoming nurse). Report included the following information SBAR, Kardex, Intake/Output, MAR, Recent Results and Cardiac Rhythm Sr. Pt resting in bed. Appears to be in no distress at this time. Call bell within reach. Zone phone number given to pt. Pt instructed to call zone phone or call bell if needed. Pt instructed to call for assistance before ambulating. 2258:  Pt stated she is no longer taking Ranexa; spoke to Dr. Ly Owens, requested and received order to discontinue Ranexa. Shift summary:  Pt had uneventful shift.

## 2017-09-22 NOTE — PROGRESS NOTES
Problem: Falls - Risk of  Goal: *Absence of Falls  Document Eligio Fall Risk and appropriate interventions in the flowsheet.    Outcome: Progressing Towards Goal  Fall Risk Interventions:  Mobility Interventions: Bed/chair exit alarm, Patient to call before getting OOB, Utilize walker, cane, or other assitive device           Medication Interventions: Patient to call before getting OOB, Teach patient to arise slowly     Elimination Interventions: Call light in reach     History of Falls Interventions: Bed/chair exit alarm, Door open when patient unattended, Investigate reason for fall, Room close to nurse's station

## 2017-09-22 NOTE — CONSULTS
Surgery Consult      Patient: Nia Weinberg MRN: [id number]  CSN: [id number]      YOB: 1935    Age: 80 y.o. Sex: female      DOA: 9/20/2017       HPI:     Nia Weinberg is a 80 y.o. female who presents to [facility] with chest pain and syncope. She states that was on vacation and had a brief episode of chest pain and cancelled her trip. She then had an episode where she blacked out, and then presented to [facility] for evaluation. In working up her syncope she received a carotid duplex that showed carotid stenosis and a consult was placed to vascular surgery by Dr. Silvia Pickett.   Ms. Oscar Weston is a diabetic and former smoker. She denies any history of unilateral weakness or paresthesias, she denies any history of speech difficulty, facial droop or visual disturbances.       Past Medical History:   Diagnosis Date    CAD (coronary artery disease)     Diabetes ([address]     insulin dependent    Essential hypertension     Hypercholesterolemia     Menopause     Other and unspecified hyperlipidemia 1/9/2013    Thyroid disease        Past Surgical History:   Procedure Laterality Date    BIOPSY BREAST      about 12 years ago    CHEST SURGERY PROCEDURE UNLISTED  2011    triple bypass    HX CORONARY ARTERY BYPASS GRAFT      HX HEART CATHETERIZATION      HX MOHS PROCEDURES  2002    right shoulder    Jade Elias  2001    3 disc - Dr. Denis Sherman       Family History   Problem Relation Age of Onset    Heart Attack Mother        Social History     Social History    Marital status:      Spouse name: N/A    Number of children: N/A    Years of education: N/A     Social History Main Topics    Smoking status: Former Smoker     Quit date: 10/3/1979    Smokeless tobacco: Never Used    Alcohol use Yes      Comment: maybe twice a month she has wine (red)    Drug use: No    Sexual activity: Not Asked     Other Topics Concern    None     Social History Narrative       Prior to Admission medications Medication Sig Start Date End Date Taking? Authorizing Provider   potassium chloride (KLOR-CON) 20 mEq packet Take 20 mEq by mouth two (2) times a day. Yes Adeline Abernathy MD   metOLazone (ZAROXOLYN) 5 mg tablet Take 5 mg by mouth daily. Yes Adeline Abernathy MD   meclizine (ANTIVERT) 12.5 mg tablet Take 12.5 mg by mouth three (3) times daily as needed. Yes Adeline Abernathy MD   isosorbide mononitrate ER (IMDUR) 30 mg tablet Take 1 Tab by mouth every morning. 6/19/14  Yes Sanjiv Zuniga MD   metoprolol succinate (TOPROL XL) 100 mg XL tablet Take 1 Tab by mouth daily. 5/2/14  Yes Kulwinder Mata NP   furosemide (LASIX) 40 mg tablet Take  by mouth daily. Yes Historical Provider   gabapentin (NEURONTIN) 300 mg capsule Take 1 capsule by mouth nightly. 11/13/14   Santiago Davis MD   insulin lispro (HUMALOG) 100 unit/mL injection 8 Units by SubCUTAneous route Before breakfast, lunch, and dinner. 11/13/14   Santiago Davis MD   hydrocodone-acetaminophen [facility])  mg tablet Take 1 tablet by mouth every six (6) hours as needed for Pain. 9/26/14   Monroe Dumont MD   Ranolazine (RANEXA) 1,000 mg Tb12 Take 1 Tab by mouth two (2) times a day. 5/6/14   Kulwinder Mata NP   levothyroxine (SYNTHROID) 125 mcg tablet Take  by mouth Daily (before breakfast). Historical Provider   multivitamins-minerals-lutein (CENTRUM SILVER) Tab Take 1 Tab by mouth daily. Historical Provider   insulin glargine (LANTUS) 100 unit/mL injection 22 Units by SubCUTAneous route nightly. Historical Provider   aspirin delayed-release (ECOTRIN LOW STRENGTH) 81 mg tablet Take 81 mg by mouth daily.     Historical Provider       Allergies   Allergen Reactions    Adhesive Rash    Demerol [Meperidine] Shortness of Breath     Pt has had fentanyl previously       Physical Exam:      Visit Vitals    /72 (BP 1 Location: Left arm, BP Patient Position: At rest)    Pulse 67    Temp 98.2 °F (36.8 °C)    Resp 19    Ht 5' 7\" (1.702 m)    Wt 192 lb 3.9 oz (87.2 kg)    SpO2 97%    BMI 30.11 kg/m2       GENERAL: alert, cooperative, no distress, appears stated age, EYE: negative findings: anicteric sclera and conjunctivae and sclerae normal, LYMPHATIC: Cervical, supraclavicular, and axillary nodes normal. , THROAT & NECK: No bruit noted, LUNG: clear to auscultation bilaterally, HEART: regular rate and rhythm, ABDOMEN: soft, non-tender. Bowel sounds normal. No masses,  no organomegaly, EXTREMITIES:  No significant edema. Feet warm and well perfused. No wounds or ulcers, NEURO: CN II-XII intact, strength equal in all extremities, no gross sensory deficits    ROS:  Constitutional: negative  Eyes: negative  Ears, nose, mouth, throat, and face: negative  Respiratory: negative  Cardiovascular: negative  Gastrointestinal: negative  Genitourinary:negative  Unless otherwise mentioned in the HPI. Data Review:    CBC:   Lab Results   Component Value Date/Time    WBC 7.5 09/20/2017 07:28 PM    RBC 4.80 09/20/2017 07:28 PM    HGB 13.4 09/20/2017 07:28 PM    HCT 38.9 09/20/2017 07:28 PM    PLATELET 536 00/76/4227 07:28 PM      BMP:   Lab Results   Component Value Date/Time    Glucose 171 09/21/2017 04:40 AM    Sodium 131 09/21/2017 04:40 AM    Potassium 3.3 09/21/2017 04:40 AM    Chloride 97 09/21/2017 04:40 AM    CO2 26 09/21/2017 04:40 AM    BUN 12 09/21/2017 04:40 AM    Creatinine 0.90 09/21/2017 04:40 AM    Calcium 8.9 09/21/2017 04:40 AM     Coagulation:   Lab Results   Component Value Date/Time    Prothrombin time 11.9 09/20/2017 07:28 PM    INR 0.9 09/20/2017 07:28 PM    aPTT 29.1 02/27/2013 12:44 AM         Assessment/Plan     I told Ms. Andi Mccain and her  that she has asymptomatic carotid stenosis by duplex. Her elevated velocity is in the mid carotid which is not the usual place for atherosclerotic narrowing and sometimes elevated velocities are due to angulation of the carotid. In follow up, she will need a CTA. She will continue ASA and her statin.   There is no indication for surgical intervention at this time.       Principal Problem:    Chest pain, unspecified (12/12/2012)    Active Problems:    Coronary atherosclerosis of native coronary artery (8/24/2012)      DM (diabetes mellitus) ([facility] (8/24/2012)      S/P CABG (coronary artery bypass graft) (12/12/2012)      HTN (hypertension), benign (12/12/2012)      A-fib ([facility] (2/27/2013)      Cardiac pacemaker in situ (6/20/2014)      Elevated troponin (11/11/2014)      Fall (9/20/2017)      Acute chest pain (9/20/2017)      Chest pain (9/20/2017)      Rib fracture (9/20/2017)      Carotid stenosis, right (9/22/2017)        Kit Westbrook MD  September 22, 2017

## 2017-09-22 NOTE — PROGRESS NOTES
Patient was visited by NOÉ. Volunteer followed up with patient and/or family and reported no needs to this . Chaplains will continue to follow and will provide pastoral care as needed or requested. [address], M.Div.   Board Certified   [phone] - Office

## 2017-09-22 NOTE — PROGRESS NOTES
D/c plan; anticipate home tomorrow    Met with patient during IDR. Patient has requested HHC. Orders placed will need a rolling walker provided at bedside and provided with safety and proper use lyudmila. CMS placed referral for Tricia Candelaria. Patient lives with her  and he will drive her home safely. .    Care Management Interventions  PCP Verified by CM:  Yes  Transition of Care Consult (CM Consult): [address]: Yes  Physical Therapy Consult: Yes  Occupational Therapy Consult: Yes  Current Support Network: Lives with Spouse  Confirm Follow Up Transport: Family  Plan discussed with Pt/Family/Caregiver: Yes  Freedom of Choice Offered: Yes  Discharge Location  Discharge Placement: Home with home health

## 2017-09-22 NOTE — PROGRESS NOTES
1930 Pt received from offgoing nurse without any signs or symptoms of distress. Pt vitals are stable and within normal limits. Pt bed in low position with wheels locked and call bell within reach. 2022 Assessment completed and documented in flow sheet. Pt denies any further needs at this time. Pt in NAD with bed in low position, wheels locked and call bell within reach. 2035 DR Carissa Holloway made aware of pt nausea and vomiting after administration of dilaudid this AM. New order to d/c dilaudid. 2147 Scheduled medications administered as ordered. 7067 Reassessment completed with no changes noted. Bed locked, in lowest position, with call light within reach. 5717 Reassessment completed with no changes noted. Bed locked, in lowest position, with call light within reach. 4743 Notified pt BG was 33. Into assess pt. Pt verbal but confused. Pt offered and accepted cranberry juice. Attempted to administer D50 for BG. Pt became combative stating that she doesn't get sugar for her low BG that she drinks milk. Second nurse in room to assist. Second nurse MARCELA Segura RN able to calm pt and administer D50. Pt more alert and able to answer questions appropriately. 1156 recheck BG= 194. Pt completely alert, oriented and verbal.  1108 Dr Carissa Holloway made aware of pt low blood glucose and interventions. 1515 Bedside and Verbal shift change report given to [address] [place] [address] (oncoming nurse) by Sujatha Bell, ROHIT   (offgoing nurse). Report included the following information SBAR, Intake/Output, MAR and Recent Results. On coming nurse made aware of hypoglycemic episode this AM and when to check BG for follow up. On coming nurse verbalized understanding.

## 2017-09-22 NOTE — PROGRESS NOTES
conducted a Follow up consultation and Spiritual Assessment for García Robert, who is a 80 y.o.,female. 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 Good support.  at bedside. Upbeat. The  provided the following Interventions:  Continued the relationship of care and support. Listened empathically. Offered prayer and assurance of continued prayer on patients behalf. Chart reviewed. The following outcomes were achieved:  Patient expressed gratitude for Spiritual Care visit. Assessment:  There are no further spiritual or Yazidism issues which require Spiritual Care Services intervention at this time. Plan:  Chaplains will continue to follow and will provide pastoral care as needed or requested.  recommends bedside caregivers page the  on duty if patient shows signs of acute spiritual or emotional distress. [address] [address].    Board Certified   [phone] - Office

## 2017-09-22 NOTE — PROGRESS NOTES
0700-  Assumed care. Report received. Meds, notes, orders and plan of care reviewed. Pt lying comfortably in bed. Denies current pain or complaint. VS stable. Pt was found to be hypoglycemic (33mg/dl) at 0630.  25g D50W given. Repeat BS 193mg/dl. Assessment completed. 1200-  Pt resting w/o pain or complaint. 1130 BS was  268mg/dl. Dr. Agustín Wiseman to change around insulin dosing to prevent recurrence of hypoglycemic episodes. Otherwise VS stable. 1600-  Pt asleep. VS stable. No interval changes. Pt to be discharged tomorrow once cleared by cardiology. 1900-  Report given to oncoming nurse. All questions answered prior to my departure.

## 2017-09-22 NOTE — DIABETES MGMT
NUTRITION ASSESSMENT / [facility] PLAN OF CARE     Dianne Crawford           80 y.o. Patient Active Problem List   Diagnosis Code    Coronary atherosclerosis of native coronary artery I25.10    DM (diabetes mellitus) ([facility] [address] E11.9    S/P CABG (coronary artery bypass graft) Z95.1    Chest pain, unspecified R07.9    HTN (hypertension), benign I10    Other and unspecified hyperlipidemia E78.5    A-fib ([facility] [address] I48.91    Sick sinus syndrome ([facility] [address] I49.5    Cardiac pacemaker in situ Z95.0    Pubic bone fracture (HCC) S32.509A    Ileus ([facility] [address] K56.7    Elevated troponin R74.8    Fall W19. Melania Elm    Acute chest pain R07.9    Chest pain R07.9    Rib fracture S22.39XA    Carotid stenosis, right I65.21        INTERVENTIONS/PLAN:     - pt triggered for Glycemic Control Consult with A1C of 8.8%, known h/o DM2, on odd ratio of basal/bolus at home  - noted consult also received for help with IP management and med recommendations  - noted repeat hypoglycemic event this AM, suspect r/t high corrective coverage dose given at HS, HS dose has been d/c'd  - recommend continue corrective coverage TID Ac only & add conservative mealtime dose of Humalog 3 units qac  - noted last POCT 348 mg/dl, Cardiac diet was in place w/o Consistent CHO, Consistent Cho 1800 kcal has been added to diet  - DM Education provided per HOS[facility] RD  (see additional note)  - encouraged OP DM Self Management Class (schedule given)  - will continue to monitor and provide recommendations re: home regimen closer to d/c, may need to follow-up with OP PCM for closer evaluation    ASSESSMENT:   Nutritional Status: obesity, excessive CHO intake, hypoglycemic event, BG out of target r/t DM       Lab Results   Component Value Date/Time    GLUCPOC 348 (H) 09/22/2017 02:04 PM    GLUCPOC 268 (H) 09/22/2017 12:32 PM    GLUCPOC 156 (H) 09/22/2017 08:38 AM             Within target range (non-ICU: <140; ICU<180): [] Yes   [x]  No    Current Insulin regimen:   - Lantus 15 units every day  - Humalog Normal Insulin Sensitivity Corrective Coverage TIC - no hs coverage r/t Am lows    Home medication/insulin regimen: *pt confirmed  - Lantus 18-22 units qhs  - Humalog 15 units qac + SS >150 mg/dl 2 units q +5- mg/dl    HbA1c: equivalent  to ave BGlucose of 206 mg/dl for 2-3 months prior to admission    Lab Results   Component Value Date/Time    Hemoglobin A1c 8.8 09/21/2017 04:30 AM       Adequate glycemic control PTA:  [] Yes  [x] No       SUBJECTIVE/OBJECTIVE:   Information obtained from: pt, chart, IDT; pt confirmed home regimen (see above), reports last A1C >9%, states SBMG 4x/day, denies <70 mg/dl at home        Medications: [x]                Reviewed        Labs:   Lab Results   Component Value Date/Time    Sodium 131 09/21/2017 04:40 AM    Potassium 3.3 09/21/2017 04:40 AM    Chloride 97 09/21/2017 04:40 AM    CO2 26 09/21/2017 04:40 AM    Anion gap 8 09/21/2017 04:40 AM    Glucose 171 09/21/2017 04:40 AM    BUN 12 09/21/2017 04:40 AM    Creatinine 0.90 09/21/2017 04:40 AM    Calcium 8.9 09/21/2017 04:40 AM    Magnesium 1.8 09/21/2017 04:40 AM    Phosphorus 4.3 02/28/2013 04:36 AM    Albumin 3.1 09/21/2017 04:40 AM       Anthropometrics: IBW : 69.9 kg (154 lb), % IBW (Calculated): 124.83 %, BMI (calculated): 30.1  Wt Readings from Last 1 Encounters:   09/22/17 87.2 kg (192 lb 3.9 oz)    Ht Readings from Last 1 Encounters:   09/22/17 5' 7\" (1.702 m)       Estimated Nutrition Needs: 1750 Kcals/day (25 kcal/kg of IBW), Protein (g): 84 g (1.4 g/kg) Fluid (ml): 1750 ml (1 ml/kcal)  Based on:   []          Actual BW    [x]          IBW   []            Adjusted BW        Diet:   Active Orders   Diet    DIET CARDIAC Regular       Intake:   Patient Vitals for the past 100 hrs:   % Diet Eaten   09/21/17 2026 100 %   09/21/17 0953 50 %       Nutrition Diagnoses:   - altered nutrition-related lab values r/t Dm AEB A1C of 8.8% and known h/o DM2  - obesity r/t h/o excessive energy intake AEB BMI of 30%    Nutrition Interventions:   - education, diet Cardiac, add Consistent CHO     Goal: .  - BG will be in target range of  mg/dl (non-ICU) by 9/25  - PO intake will continue at least 75% of meals offered by 9/25  - pt will maintain current wt/prevent wt gain by 10/22  - pt will follow up with OP PCP for DM by 10/22       Nutrition Monitoring and Evaluation      [x]     Monitor po intake on meal rounds  [x]     Continue inpatient monitoring and intervention  [x]     Other: BG      Nutrition Risk:  []   High     []  Moderate    [x]  Minimal/Uncompromised    ISELA Diehl, MPH, RD, CDE

## 2017-09-23 VITALS
OXYGEN SATURATION: 96 % | RESPIRATION RATE: 16 BRPM | SYSTOLIC BLOOD PRESSURE: 111 MMHG | HEART RATE: 65 BPM | WEIGHT: 187.61 LBS | HEIGHT: 67 IN | BODY MASS INDEX: 29.45 KG/M2 | DIASTOLIC BLOOD PRESSURE: 54 MMHG | TEMPERATURE: 97.7 F

## 2017-09-23 LAB
ATRIAL RATE: 63 BPM
ATRIAL RATE: 66 BPM
CALCULATED P AXIS, ECG09: -25 DEGREES
CALCULATED R AXIS, ECG10: -18 DEGREES
CALCULATED R AXIS, ECG10: -25 DEGREES
CALCULATED T AXIS, ECG11: -28 DEGREES
CALCULATED T AXIS, ECG11: 16 DEGREES
DIAGNOSIS, 93000: NORMAL
DIAGNOSIS, 93000: NORMAL
GLUCOSE BLD STRIP.AUTO-MCNC: 221 MG/DL (ref 70–110)
GLUCOSE BLD STRIP.AUTO-MCNC: 260 MG/DL (ref 70–110)
P-R INTERVAL, ECG05: 94 MS
P-R INTERVAL, ECG05: 96 MS
Q-T INTERVAL, ECG07: 466 MS
Q-T INTERVAL, ECG07: 486 MS
QRS DURATION, ECG06: 130 MS
QRS DURATION, ECG06: 136 MS
QTC CALCULATION (BEZET), ECG08: 476 MS
QTC CALCULATION (BEZET), ECG08: 509 MS
VENTRICULAR RATE, ECG03: 63 BPM
VENTRICULAR RATE, ECG03: 66 BPM

## 2017-09-23 PROCEDURE — 74011250637 HC RX REV CODE- 250/637: Performed by: INTERNAL MEDICINE

## 2017-09-23 PROCEDURE — 74011250637 HC RX REV CODE- 250/637: Performed by: HOSPITALIST

## 2017-09-23 PROCEDURE — 82962 GLUCOSE BLOOD TEST: CPT

## 2017-09-23 PROCEDURE — 74011250636 HC RX REV CODE- 250/636: Performed by: INTERNAL MEDICINE

## 2017-09-23 PROCEDURE — 99218 HC RM OBSERVATION: CPT

## 2017-09-23 PROCEDURE — 96372 THER/PROPH/DIAG INJ SC/IM: CPT

## 2017-09-23 PROCEDURE — 74011636637 HC RX REV CODE- 636/637: Performed by: HOSPITALIST

## 2017-09-23 RX ORDER — LIDOCAINE 50 MG/G
PATCH TOPICAL
Qty: 15 EACH | Refills: 0 | Status: SHIPPED | OUTPATIENT
Start: 2017-09-23

## 2017-09-23 RX ORDER — LIDOCAINE 50 MG/G
PATCH TOPICAL
Qty: 15 EACH | Refills: 0 | Status: SHIPPED | OUTPATIENT
Start: 2017-09-23 | End: 2017-09-23

## 2017-09-23 RX ADMIN — ASPIRIN 81 MG: 81 TABLET, COATED ORAL at 09:03

## 2017-09-23 RX ADMIN — LEVOTHYROXINE SODIUM 125 MCG: 125 TABLET ORAL at 07:30

## 2017-09-23 RX ADMIN — MULTIPLE VITAMINS W/ MINERALS TAB 1 TABLET: TAB at 09:03

## 2017-09-23 RX ADMIN — INSULIN LISPRO 3 UNITS: 100 INJECTION, SOLUTION INTRAVENOUS; SUBCUTANEOUS at 08:50

## 2017-09-23 RX ADMIN — INSULIN LISPRO 3 UNITS: 100 INJECTION, SOLUTION INTRAVENOUS; SUBCUTANEOUS at 12:23

## 2017-09-23 RX ADMIN — FUROSEMIDE 80 MG: 40 TABLET ORAL at 09:03

## 2017-09-23 RX ADMIN — INSULIN LISPRO 6 UNITS: 100 INJECTION, SOLUTION INTRAVENOUS; SUBCUTANEOUS at 12:22

## 2017-09-23 RX ADMIN — HEPARIN SODIUM 5000 UNITS: 5000 INJECTION, SOLUTION INTRAVENOUS; SUBCUTANEOUS at 05:36

## 2017-09-23 RX ADMIN — METOPROLOL SUCCINATE 100 MG: 100 TABLET, EXTENDED RELEASE ORAL at 09:03

## 2017-09-23 RX ADMIN — INSULIN GLARGINE 15 UNITS: 100 INJECTION, SOLUTION SUBCUTANEOUS at 12:23

## 2017-09-23 RX ADMIN — INSULIN LISPRO 4 UNITS: 100 INJECTION, SOLUTION INTRAVENOUS; SUBCUTANEOUS at 07:30

## 2017-09-23 RX ADMIN — NAPROXEN 500 MG: 250 TABLET ORAL at 09:03

## 2017-09-23 RX ADMIN — ISOSORBIDE MONONITRATE 30 MG: 30 TABLET, EXTENDED RELEASE ORAL at 07:00

## 2017-09-23 NOTE — DISCHARGE INSTRUCTIONS
DISCHARGE SUMMARY from Nurse    The following personal items are in your possession at time of discharge:       Visual Aid: Glasses, With patient                            PATIENT INSTRUCTIONS:    After general anesthesia or intravenous sedation, for 24 hours or while taking prescription Narcotics:  · Limit your activities  · Do not drive and operate hazardous machinery  · Do not make important personal or business decisions  · Do  not drink alcoholic beverages  · If you have not urinated within 8 hours after discharge, please contact your surgeon on call. Report the following to your surgeon:  · Excessive pain, swelling, redness or odor of or around the surgical area  · Temperature over 100.5  · Nausea and vomiting lasting longer than 4 hours or if unable to take medications  · Any signs of decreased circulation or nerve impairment to extremity: change in color, persistent  numbness, tingling, coldness or increase pain  · Any questions        What to do at Home:  Recommended activity: Activity as tolerated. Please follow up with (see follow up instructions). *  Please give a list of your current medications to your Primary Care Provider. *  Please update this list whenever your medications are discontinued, doses are      changed, or new medications (including over-the-counter products) are added. *  Please carry medication information at all times in case of emergency situations. These are general instructions for a healthy lifestyle:    No smoking/ No tobacco products/ Avoid exposure to second hand smoke    Surgeon General's Warning:  Quitting smoking now greatly reduces serious risk to your health.     Obesity, smoking, and sedentary lifestyle greatly increases your risk for illness    A healthy diet, regular physical exercise & weight monitoring are important for maintaining a healthy lifestyle    You may be retaining fluid if you have a history of heart failure or if you experience any of the following symptoms:  Weight gain of 3 pounds or more overnight or 5 pounds in a week, increased swelling in our hands or feet or shortness of breath while lying flat in bed. Please call your doctor as soon as you notice any of these symptoms; do not wait until your next office visit. Recognize signs and symptoms of STROKE:    F-face looks uneven    A-arms unable to move or move unevenly    S-speech slurred or non-existent    T-time-call 911 as soon as signs and symptoms begin-DO NOT go       Back to bed or wait to see if you get better-TIME IS BRAIN. Warning Signs of HEART ATTACK     Call 911 if you have these symptoms:   Chest discomfort. Most heart attacks involve discomfort in the center of the chest that lasts more than a few minutes, or that goes away and comes back. It can feel like uncomfortable pressure, squeezing, fullness, or pain.  Discomfort in other areas of the upper body. Symptoms can include pain or discomfort in one or both arms, the back, neck, jaw, or stomach.  Shortness of breath with or without chest discomfort.  Other signs may include breaking out in a cold sweat, nausea, or lightheadedness. Don't wait more than five minutes to call 911 - MINUTES MATTER! Fast action can save your life. Calling 911 is almost always the fastest way to get lifesaving treatment. Emergency Medical Services staff can begin treatment when they arrive -- up to an hour sooner than if someone gets to the hospital by car. The discharge information has been reviewed with the patient. The patient verbalized understanding. Discharge medications reviewed with the patient and appropriate educational materials and side effects teaching were provided.     Patient armband removed and shredded                Lab Results   Component Value Date/Time    Hemoglobin A1c 8.8 09/21/2017 04:30 AM       This lab test reflects that your blood sugar has been slightly elevated over the past 3 months and should be evaluated by your primary care provider. An A1C of 5.7-6.4% meets the criteria for pre-diabetes; an A1C of 6.5% or higher meets the criteria for diabetes. This lab test reflects that your blood sugar averaged 206 mg/dL  over the past 3 months. It is important to follow up with your provider on a routine basis to continue to evaluate your blood sugar and discuss any necessary changes in treatment.

## 2017-09-23 NOTE — ROUTINE PROCESS
Bedside and Verbal shift change report given to Jennifer Diaz RN (oncoming nurse) by Geary Phoenix, RN (offgoing nurse).  Report included the following information SBAR, Kardex, Intake/Output, MAR, Recent Results, Med Rec Status and Cardiac Rhythm SR.

## 2017-09-23 NOTE — H&P
Discharge Summary    Patient: García Robert MRN: [id number]  CSN: [id number]    YOB: 1935  Age: 80 y.o. Sex: female    DOA: 9/20/2017 LOS:  LOS: 0 days   Discharge Date:      Primary Care Provider:  Bandar Tirado MD    Admission Diagnoses: Acute chest pain  Chest pain    Discharge Diagnoses:    Hospital Problems  Date Reviewed: 9/20/2017          Codes Class Noted POA    Carotid stenosis, right ICD-10-CM: I65.21  ICD-9-CM: 433.10  9/22/2017 Unknown        Fall ICD-10-CM: W19. Marzella Mins  ICD-9-CM: E888.9  9/20/2017 Unknown        Acute chest pain ICD-10-CM: R07.9  ICD-9-CM: 786.50  9/20/2017 Unknown        Chest pain ICD-10-CM: R07.9  ICD-9-CM: 786.50  9/20/2017 Unknown        Rib fracture ICD-10-CM: S22.39XA  ICD-9-CM: 807.00  9/20/2017 Unknown        Elevated troponin ICD-10-CM: R74.8  ICD-9-CM: 790.6  11/11/2014 Yes        Cardiac pacemaker in situ ICD-10-CM: Z95.0  ICD-9-CM: V45.01  6/20/2014 Yes        A-fib ([address] ICD-10-CM: I48.91  ICD-9-CM: 427.31  2/27/2013 Yes        S/P CABG (coronary artery bypass graft) ICD-10-CM: Z95.1  ICD-9-CM: V45.81  12/12/2012 Yes        * (Principal)Chest pain, unspecified ICD-10-CM: R07.9  ICD-9-CM: 786.50  12/12/2012 Yes        HTN (hypertension), benign ICD-10-CM: I10  ICD-9-CM: 401.1  12/12/2012 Yes        Coronary atherosclerosis of native coronary artery ICD-10-CM: I25.10  ICD-9-CM: 414.01  8/24/2012 Yes        DM (diabetes mellitus) (HCC) (Chronic) ICD-10-CM: E11.9  ICD-9-CM: 250.00  8/24/2012 Yes              Discharge Condition: Stable    Discharge Medications:     Current Discharge Medication List      START taking these medications    Details   lidocaine (LIDODERM) 5 % Apply patch to the affected area for 12 hours a day and remove for 12 hours a day. Qty: 15 Each, Refills: 0         CONTINUE these medications which have NOT CHANGED    Details   potassium chloride (KLOR-CON) 20 mEq packet Take 20 mEq by mouth two (2) times a day. metOLazone (ZAROXOLYN) 5 mg tablet Take 5 mg by mouth daily. meclizine (ANTIVERT) 12.5 mg tablet Take 12.5 mg by mouth three (3) times daily as needed. isosorbide mononitrate ER (IMDUR) 30 mg tablet Take 1 Tab by mouth every morning. Qty: 90 Tab, Refills: 3      metoprolol succinate (TOPROL XL) 100 mg XL tablet Take 1 Tab by mouth daily. Qty: 90 Tab, Refills: 3      furosemide (LASIX) 40 mg tablet Take  by mouth daily. Associated Diagnoses: A-fib ([facility] [address] HTN (hypertension), benign; Coronary atherosclerosis of native coronary artery; S/P CABG (coronary artery bypass graft); Dyslipidemia; Fatigue; Cough      gabapentin (NEURONTIN) 300 mg capsule Take 1 capsule by mouth nightly. Qty: 30 capsule, Refills: 1      insulin lispro (HUMALOG) 100 unit/mL injection 8 Units by SubCUTAneous route Before breakfast, lunch, and dinner. Qty: 1 vial, Refills: 1      hydrocodone-acetaminophen (NORCO)  mg tablet Take 1 tablet by mouth every six (6) hours as needed for Pain. Qty: 20 tablet, Refills: 0      Ranolazine (RANEXA) 1,000 mg Tb12 Take 1 Tab by mouth two (2) times a day. Qty: 180 Tab, Refills: 3    Associated Diagnoses: Coronary atherosclerosis of native coronary artery; S/P CABG (coronary artery bypass graft); Dyslipidemia; HTN (hypertension), benign; Fatigue; Leg swelling; PAF (paroxysmal atrial fibrillation) ([facility])      levothyroxine (SYNTHROID) 125 mcg tablet Take  by mouth Daily (before breakfast). multivitamins-minerals-lutein (CENTRUM SILVER) Tab Take 1 Tab by mouth daily. insulin glargine (LANTUS) 100 unit/mL injection 22 Units by SubCUTAneous route nightly. aspirin delayed-release (ECOTRIN LOW STRENGTH) 81 mg tablet Take 81 mg by mouth daily.              Procedures : none    Consults: Cardiology and vascular       PHYSICAL EXAM   Visit Vitals    /54 (BP 1 Location: Left arm, BP Patient Position: Sitting)    Pulse 65    Temp 97.7 °F (36.5 °C)    Resp 16    Ht 5' 7\" (1.702 m)    Wt 85.1 kg (187 lb 9.8 oz)    SpO2 96%    BMI 29.38 kg/m2     General: Awake, cooperative, no acute distress    HEENT: NC, Atraumatic. PERRLA, EOMI. Anicteric sclerae. Lungs:  CTA Bilaterally. No Wheezing/Rhonchi/Rales. Heart:  Regular  rhythm,  No murmur, No Rubs, No Gallops  Abdomen: Soft, Non distended, Non tender. +Bowel sounds,   Extremities: No c/c/e  Psych:   Not anxious or agitated. Neurologic:  No acute neurological deficits. Admission HPI :     Bernardo Barrera is a 80 y.o. female who comes to the ED with complaints of chest pain. Patient states she was out of town last week and when she went to use a public rest room she felt \"overwhelmed and lightheaded\" and slowly passed out in the bathroom. She thinks she fell on the fell side without any head trauma. She think she passed out for about 20 mins but its very unclear. Her  took her home because she felt ok after that. But the following day she started having left sided chest pain so she went to the closest ER. She was evaluated with CXR and pacemaker interrogation. She was noted to have acute left 10th rib fx. It was conservatively management and she was discharged. She has been taking tylenol since. She did complain of tight chest pain throughout last week and than over last two days she think her pain has been rotating between right and the left side. Due to the pain, her appetite has been poor as well.      She states she has been feeling overwhelmed for past 3-4 weeks for which she saw her Primary Cardiologist, Dr Catrina Huston, who placed her on Holter monitor. Results of which are still pending according to her.      In the ED she was noted to have slightly elevated trop but looks at the past, they are at her baseline. She was given morphine which didn't help with her pain.      She continues to complaint of on/off right and left sided chest pain.    Hospital Course :   Since she was admitted, she was put on cardiac monitor. ce trend was monitored closed and  Cardiology was on board, no acs indicated. Her elevated trop was due to leaking from rib fracture. Carotid ultrasound was performed for her syncope and vascular on board for the stenosis. Recommended to f/u as out patient. We also controlled her pain with lidocaine patch. Activity: Activity as tolerated    Diet: Cardiac Diet and Diabetic Diet    Follow-up: pcm , cardiology     Disposition: home     Minutes spent on discharge: 45 min      Labs: Results:       Chemistry Recent Labs      09/21/17   0440  09/20/17 1928   GLU  171*  79   NA  131*  137   K  3.3*  3.2*   CL  97*  101   CO2  26  28   BUN  12  11   CREA  0.90  1.01   CA  8.9  9.4   AGAP  8  8   BUCR  13  11*   AP  124*   --    TP  7.1   --    ALB  3.1*   --    GLOB  4.0   --    AGRAT  0.8   --       CBC w/Diff Recent Labs      09/20/17 1928   WBC  7.5   RBC  4.80   HGB  13.4   HCT  38.9   PLT  230   GRANS  58   LYMPH  31   EOS  2      Cardiac Enzymes Recent Labs      09/21/17   1230  09/21/17   0440   CPK  85  103   CKND1  1.4  1.1      Coagulation Recent Labs      09/20/17 1928   PTP  11.9   INR  0.9       Lipid Panel Lab Results   Component Value Date/Time    Cholesterol, total 149 06/06/2013 09:06 AM    HDL Cholesterol 69 06/06/2013 09:06 AM    LDL, calculated 66 06/06/2013 09:06 AM    VLDL, calculated 14 06/06/2013 09:06 AM    Triglyceride 71 06/06/2013 09:06 AM    CHOL/HDL Ratio 2.6 02/28/2013 04:36 AM      BNP No results for input(s): BNPP in the last 72 hours.    Liver Enzymes Recent Labs      09/21/17 0440   TP  7.1   ALB  3.1*   AP  124*   SGOT  22      Thyroid Studies Lab Results   Component Value Date/Time    TSH 0.548 05/06/2013 10:36 AM            Significant Diagnostic Studies: Xr Chest Port    Result Date: 9/21/2017  Chest, single view Indication: Chest pain, shortness of breath Comparison: 11/8/2014 Findings:  Portable upright AP view of the chest was obtained. Unchanged left-sided cardiac pacing device. Previous median sternotomy. The cardiomediastinal silhouette appear unchanged. Unchanged central vascularity, likely pulmonary arterial hypertension. Tortuous and atherosclerotic thoracic aorta. Low lung volumes with associated central vascular crowding. No pleural effusion nor pneumothorax. Unchanged chronic left and right rib fractures. No acute osseous abnormality. Impression: Low lung volumes with no radiographic evidence of an acute abnormality. [facility]     CC:  Carey Gore MD

## 2017-09-23 NOTE — DISCHARGE SUMMARY
Discharge Summary     Patient: Bev Colunga MRN: [id number]  CSN: [id number]    YOB: 1935  Age: 80 y.o. Sex: female    DOA: 9/20/2017 LOS:  LOS: 0 days   Discharge Date:       Primary Care Provider:  Stuart Cody MD     Admission Diagnoses: Acute chest pain  Chest pain     Discharge Diagnoses:    Hospital Problems  Date Reviewed: 9/20/2017             Codes Class Noted POA     Carotid stenosis, right ICD-10-CM: I65.21  ICD-9-CM: 433.10   9/22/2017 Unknown           Fall ICD-10-CM: W19. Bri Hubbard  ICD-9-CM: I447. 9   9/20/2017 Unknown           Acute chest pain ICD-10-CM: R07.9  ICD-9-CM: 786.50   9/20/2017 Unknown           Chest pain ICD-10-CM: R07.9  ICD-9-CM: 786.50   9/20/2017 Unknown           Rib fracture ICD-10-CM: S22.39XA  ICD-9-CM: 807.00   9/20/2017 Unknown           Elevated troponin ICD-10-CM: R74.8  ICD-9-CM: 790.6   11/11/2014 Yes           Cardiac pacemaker in situ ICD-10-CM: Z95.0  ICD-9-CM: V45.01   6/20/2014 Yes           A-fib (HCC) ICD-10-CM: I48.91  ICD-9-CM: 427.31   2/27/2013 Yes           S/P CABG (coronary artery bypass graft) ICD-10-CM: Z95.1  ICD-9-CM: V45.81   12/12/2012 Yes           * (Principal)Chest pain, unspecified ICD-10-CM: R07.9  ICD-9-CM: 786.50   12/12/2012 Yes           HTN (hypertension), benign ICD-10-CM: I10  ICD-9-CM: 301. 1   12/12/2012 Yes           Coronary atherosclerosis of native coronary artery ICD-10-CM: I25.10  ICD-9-CM: 414.01   8/24/2012 Yes           DM (diabetes mellitus) ([facility] (Chronic) ICD-10-CM: E11.9  ICD-9-CM: 250.00   8/24/2012 Yes                  Discharge Condition: Stable     Discharge Medications:          Current Discharge Medication List            START taking these medications     Details   lidocaine (LIDODERM) 5 % Apply patch to the affected area for 12 hours a day and remove for 12 hours a day.   Qty: 15 Each, Refills: 0                CONTINUE these medications which have NOT CHANGED     Details   potassium chloride (KLOR-CON) 20 mEq packet Take 20 mEq by mouth two (2) times a day.       metOLazone (ZAROXOLYN) 5 mg tablet Take 5 mg by mouth daily.       meclizine (ANTIVERT) 12.5 mg tablet Take 12.5 mg by mouth three (3) times daily as needed.       isosorbide mononitrate ER (IMDUR) 30 mg tablet Take 1 Tab by mouth every morning. Qty: 90 Tab, Refills: 3       metoprolol succinate (TOPROL XL) 100 mg XL tablet Take 1 Tab by mouth daily. Qty: 90 Tab, Refills: 3       furosemide (LASIX) 40 mg tablet Take  by mouth daily.     Associated Diagnoses: A-fib ([facility]); HTN (hypertension), benign; Coronary atherosclerosis of native coronary artery; S/P CABG (coronary artery bypass graft); Dyslipidemia; Fatigue; Cough       gabapentin (NEURONTIN) 300 mg capsule Take 1 capsule by mouth nightly. Qty: 30 capsule, Refills: 1       insulin lispro (HUMALOG) 100 unit/mL injection 8 Units by SubCUTAneous route Before breakfast, lunch, and dinner. Qty: 1 vial, Refills: 1       hydrocodone-acetaminophen (NORCO)  mg tablet Take 1 tablet by mouth every six (6) hours as needed for Pain. Qty: 20 tablet, Refills: 0       Ranolazine (RANEXA) 1,000 mg Tb12 Take 1 Tab by mouth two (2) times a day. Qty: 180 Tab, Refills: 3     Associated Diagnoses: Coronary atherosclerosis of native coronary artery; S/P CABG (coronary artery bypass graft); Dyslipidemia; HTN (hypertension), benign;  Fatigue; Leg swelling; PAF (paroxysmal atrial fibrillation) ([facility])       levothyroxine (SYNTHROID) 125 mcg tablet Take  by mouth Daily (before breakfast).       multivitamins-minerals-lutein (CENTRUM SILVER) Tab Take 1 Tab by mouth daily.         insulin glargine (LANTUS) 100 unit/mL injection 22 Units by SubCUTAneous route nightly.       aspirin delayed-release (ECOTRIN LOW STRENGTH) 81 mg tablet Take 81 mg by mouth daily.                 Procedures : none     Consults: Cardiology and vascular         PHYSICAL EXAM        Visit Vitals    /54 (BP 1 Location: Left arm, BP Patient Position: Sitting)    Pulse 65    Temp 97.7 °F (36.5 °C)    Resp 16    Ht 5' 7\" (1.702 m)    Wt 85.1 kg (187 lb 9.8 oz)    SpO2 96%    BMI 29.38 kg/m2      General:                    Awake, cooperative, no acute distress    HEENT:                     NC, Atraumatic. PERRLA, EOMI. Anicteric sclerae. Lungs:                                 CTA Bilaterally. No Wheezing/Rhonchi/Rales. Heart:                                  Regular  rhythm,  No murmur, No Rubs, No Gallops  Abdomen:                  Soft, Non distended, Non tender. +Bowel sounds,   Extremities:               No c/c/e  Psych:                       Not anxious or agitated. Neurologic:                No acute neurological deficits.                Admission HPI :      Ngoc Saldivar a 80 y.o. female who comes to the ED with complaints of chest pain. Patient states she was out of town last week and when she went to use a public rest room she felt \"overwhelmed and lightheaded\" and slowly passed out in the bathroom. She thinks she fell on the fell side without any head trauma. She think she passed out for about 20 mins but its very unclear. Her  took her home because she felt ok after that. But the following day she started having left sided chest pain so she went to the closest ER. She was evaluated with CXR and pacemaker interrogation. She was noted to have acute left 10th rib fx. It was conservatively management and she was discharged. She has been taking tylenol since. She did complain of tight chest pain throughout last week and than over last two days she think her pain has been rotating between right and the left side. Due to the pain, her appetite has been poor as well.       She states she has been feeling overwhelmed for past 3-4 weeks for which she saw her Primary Cardiologist, Dr Ariella Zuniga, who placed her on Holter monitor.  Results of which are still pending according to her.       In the ED she was noted to have slightly elevated trop but looks at the past, they are at her baseline. She was given morphine which didn't help with her pain.       She continues to complaint of on/off right and left sided chest pain. Hospital Course :   Since she was admitted, she was put on cardiac monitor. ce trend was monitored closed and  Cardiology was on board, no acs indicated. Her elevated trop was due to leaking from rib fracture. Carotid ultrasound was performed for her syncope and vascular on board for the stenosis. Recommended to f/u as out patient. We also controlled her pain with lidocaine patch.      Activity: Activity as tolerated     Diet: Cardiac Diet and Diabetic Diet     Follow-up: pcm , cardiology      Disposition: home      Minutes spent on discharge: 45 min        Labs: Results:         Chemistry      Recent Labs       09/21/17   0440  09/20/17 1928   GLU  171*  79   NA  131*  137   K  3.3*  3.2*   CL  97*  101   CO2  26  28   BUN  12  11   CREA  0.90  1.01   CA  8.9  9.4   AGAP  8  8   BUCR  13  11*   AP  124*   --    TP  7.1   --    ALB  3.1*   --    GLOB  4.0   --    AGRAT  0.8   --        CBC w/Diff     Recent Labs       09/20/17 1928   WBC  7.5   RBC  4.80   HGB  13.4   HCT  38.9   PLT  230   GRANS  58   LYMPH  31   EOS  2       Cardiac Enzymes      Recent Labs       09/21/17   1230  09/21/17 0440   CPK  85  103   CKND1  1.4  1.1       Coagulation     Recent Labs       09/20/17 1928   PTP  11.9   INR  0.9       Lipid Panel       Lab Results   Component Value Date/Time     Cholesterol, total 149 06/06/2013 09:06 AM     HDL Cholesterol 69 06/06/2013 09:06 AM     LDL, calculated 66 06/06/2013 09:06 AM     VLDL, calculated 14 06/06/2013 09:06 AM     Triglyceride 71 06/06/2013 09:06 AM     CHOL/HDL Ratio 2.6 02/28/2013 04:36 AM       BNP No results for input(s): BNPP in the last 72 hours.    Liver Enzymes     Recent Labs       09/21/17   0440   TP  7.1   ALB  3.1*   AP  124*   SGOT  22       Thyroid Studies Lab Results   Component Value Date/Time     TSH 0.548 05/06/2013 10:36 AM              Significant Diagnostic Studies: Xr Chest Port     Result Date: 9/21/2017  Chest, single view Indication: Chest pain, shortness of breath Comparison: 11/8/2014 Findings:  Portable upright AP view of the chest was obtained. Unchanged left-sided cardiac pacing device. Previous median sternotomy. The cardiomediastinal silhouette appear unchanged. Unchanged central vascularity, likely pulmonary arterial hypertension. Tortuous and atherosclerotic thoracic aorta. Low lung volumes with associated central vascular crowding. No pleural effusion nor pneumothorax. Unchanged chronic left and right rib fractures. No acute osseous abnormality.      Impression: Low lung volumes with no radiographic evidence of an acute abnormality.                 ROBIN BROWN,Internal Medicine      CC:  Luther Riedel, MD

## 2017-09-23 NOTE — ROUTINE PROCESS
Dual AVS reviewed with Malik John. All medications reviewed individually with patient. Opportunities for questions and concerns provided. Patient discharged via (mode of transport ie. Car, ambulance or air transport) car with . Patient's arm band appropriately discarded.

## 2017-09-24 ENCOUNTER — HOME CARE VISIT (OUTPATIENT)
Dept: SCHEDULING | Facility: HOME HEALTH | Age: 82
End: 2017-09-24
Payer: MEDICARE

## 2017-09-24 PROCEDURE — 3331090001 HH PPS REVENUE CREDIT

## 2017-09-24 PROCEDURE — 3331090002 HH PPS REVENUE DEBIT

## 2017-09-24 PROCEDURE — 400013 HH SOC

## 2017-09-24 PROCEDURE — G0299 HHS/HOSPICE OF RN EA 15 MIN: HCPCS

## 2017-09-25 ENCOUNTER — HOME CARE VISIT (OUTPATIENT)
Dept: HOME HEALTH SERVICES | Facility: HOME HEALTH | Age: 82
End: 2017-09-25
Payer: MEDICARE

## 2017-09-25 DIAGNOSIS — I65.21 CAROTID STENOSIS, RIGHT: Primary | ICD-10-CM

## 2017-09-25 PROCEDURE — 3331090002 HH PPS REVENUE DEBIT

## 2017-09-25 PROCEDURE — 3331090001 HH PPS REVENUE CREDIT

## 2017-09-25 NOTE — PROGRESS NOTES
Type of procedure:  CTA of neck     Weight in appropriate: yes      Name of pre scriber:  Dr. Aguilar Serum     Date and time order was received:   5901  09/25/17

## 2017-09-26 ENCOUNTER — HOME CARE VISIT (OUTPATIENT)
Dept: SCHEDULING | Facility: HOME HEALTH | Age: 82
End: 2017-09-26
Payer: MEDICARE

## 2017-09-26 VITALS
DIASTOLIC BLOOD PRESSURE: 62 MMHG | HEART RATE: 67 BPM | TEMPERATURE: 97 F | OXYGEN SATURATION: 98 % | SYSTOLIC BLOOD PRESSURE: 118 MMHG | RESPIRATION RATE: 16 BRPM

## 2017-09-26 PROCEDURE — 3331090001 HH PPS REVENUE CREDIT

## 2017-09-26 PROCEDURE — G0151 HHCP-SERV OF PT,EA 15 MIN: HCPCS

## 2017-09-26 PROCEDURE — 3331090002 HH PPS REVENUE DEBIT

## 2017-09-27 ENCOUNTER — HOME CARE VISIT (OUTPATIENT)
Dept: SCHEDULING | Facility: HOME HEALTH | Age: 82
End: 2017-09-27
Payer: MEDICARE

## 2017-09-27 VITALS
SYSTOLIC BLOOD PRESSURE: 105 MMHG | OXYGEN SATURATION: 99 % | RESPIRATION RATE: 14 BRPM | TEMPERATURE: 97 F | DIASTOLIC BLOOD PRESSURE: 74 MMHG | HEART RATE: 60 BPM

## 2017-09-27 PROCEDURE — G0495 RN CARE TRAIN/EDU IN HH: HCPCS

## 2017-09-27 PROCEDURE — 3331090002 HH PPS REVENUE DEBIT

## 2017-09-27 PROCEDURE — 3331090001 HH PPS REVENUE CREDIT

## 2017-09-27 PROCEDURE — G0157 HHC PT ASSISTANT EA 15: HCPCS

## 2017-09-28 PROCEDURE — 3331090001 HH PPS REVENUE CREDIT

## 2017-09-28 PROCEDURE — 3331090002 HH PPS REVENUE DEBIT

## 2017-09-29 PROCEDURE — 3331090001 HH PPS REVENUE CREDIT

## 2017-09-29 PROCEDURE — 3331090002 HH PPS REVENUE DEBIT

## 2017-09-30 ENCOUNTER — HOME CARE VISIT (OUTPATIENT)
Dept: SCHEDULING | Facility: HOME HEALTH | Age: 82
End: 2017-09-30
Payer: MEDICARE

## 2017-09-30 PROCEDURE — G0495 RN CARE TRAIN/EDU IN HH: HCPCS

## 2017-09-30 PROCEDURE — 3331090002 HH PPS REVENUE DEBIT

## 2017-09-30 PROCEDURE — 3331090001 HH PPS REVENUE CREDIT

## 2017-10-01 PROCEDURE — 3331090001 HH PPS REVENUE CREDIT

## 2017-10-01 PROCEDURE — 3331090002 HH PPS REVENUE DEBIT

## 2017-10-02 VITALS
HEART RATE: 81 BPM | RESPIRATION RATE: 16 BRPM | DIASTOLIC BLOOD PRESSURE: 78 MMHG | OXYGEN SATURATION: 98 % | TEMPERATURE: 97 F | SYSTOLIC BLOOD PRESSURE: 130 MMHG

## 2017-10-02 PROCEDURE — 3331090002 HH PPS REVENUE DEBIT

## 2017-10-02 PROCEDURE — 3331090001 HH PPS REVENUE CREDIT

## 2017-10-03 ENCOUNTER — HOME CARE VISIT (OUTPATIENT)
Dept: SCHEDULING | Facility: HOME HEALTH | Age: 82
End: 2017-10-03
Payer: MEDICARE

## 2017-10-03 PROCEDURE — 3331090001 HH PPS REVENUE CREDIT

## 2017-10-03 PROCEDURE — G0157 HHC PT ASSISTANT EA 15: HCPCS

## 2017-10-03 PROCEDURE — 3331090002 HH PPS REVENUE DEBIT

## 2017-10-04 ENCOUNTER — HOME CARE VISIT (OUTPATIENT)
Dept: SCHEDULING | Facility: HOME HEALTH | Age: 82
End: 2017-10-04
Payer: MEDICARE

## 2017-10-04 PROCEDURE — 3331090001 HH PPS REVENUE CREDIT

## 2017-10-04 PROCEDURE — 3331090002 HH PPS REVENUE DEBIT

## 2017-10-04 PROCEDURE — G0299 HHS/HOSPICE OF RN EA 15 MIN: HCPCS

## 2017-10-05 ENCOUNTER — HOME CARE VISIT (OUTPATIENT)
Dept: SCHEDULING | Facility: HOME HEALTH | Age: 82
End: 2017-10-05
Payer: MEDICARE

## 2017-10-05 PROCEDURE — 3331090002 HH PPS REVENUE DEBIT

## 2017-10-05 PROCEDURE — G0157 HHC PT ASSISTANT EA 15: HCPCS

## 2017-10-05 PROCEDURE — 3331090001 HH PPS REVENUE CREDIT

## 2017-10-06 PROCEDURE — 3331090002 HH PPS REVENUE DEBIT

## 2017-10-06 PROCEDURE — 3331090001 HH PPS REVENUE CREDIT

## 2017-10-07 VITALS
HEART RATE: 74 BPM | OXYGEN SATURATION: 99 % | TEMPERATURE: 97 F | WEIGHT: 177.5 LBS | BODY MASS INDEX: 27.8 KG/M2 | DIASTOLIC BLOOD PRESSURE: 79 MMHG | RESPIRATION RATE: 74 BRPM | SYSTOLIC BLOOD PRESSURE: 128 MMHG

## 2017-10-07 PROCEDURE — 3331090002 HH PPS REVENUE DEBIT

## 2017-10-07 PROCEDURE — 3331090001 HH PPS REVENUE CREDIT

## 2017-10-08 PROCEDURE — 3331090001 HH PPS REVENUE CREDIT

## 2017-10-08 PROCEDURE — 3331090002 HH PPS REVENUE DEBIT

## 2017-10-09 PROCEDURE — 3331090001 HH PPS REVENUE CREDIT

## 2017-10-09 PROCEDURE — 3331090002 HH PPS REVENUE DEBIT

## 2017-10-10 ENCOUNTER — HOME CARE VISIT (OUTPATIENT)
Dept: SCHEDULING | Facility: HOME HEALTH | Age: 82
End: 2017-10-10
Payer: MEDICARE

## 2017-10-10 PROCEDURE — G0299 HHS/HOSPICE OF RN EA 15 MIN: HCPCS

## 2017-10-10 PROCEDURE — 3331090001 HH PPS REVENUE CREDIT

## 2017-10-10 PROCEDURE — 3331090002 HH PPS REVENUE DEBIT

## 2017-10-11 ENCOUNTER — HOME CARE VISIT (OUTPATIENT)
Dept: HOME HEALTH SERVICES | Facility: HOME HEALTH | Age: 82
End: 2017-10-11
Payer: MEDICARE

## 2017-10-11 PROCEDURE — G0157 HHC PT ASSISTANT EA 15: HCPCS

## 2017-10-11 PROCEDURE — 3331090001 HH PPS REVENUE CREDIT

## 2017-10-11 PROCEDURE — 3331090002 HH PPS REVENUE DEBIT

## 2017-10-12 PROCEDURE — 3331090001 HH PPS REVENUE CREDIT

## 2017-10-12 PROCEDURE — 3331090002 HH PPS REVENUE DEBIT

## 2017-10-13 VITALS
SYSTOLIC BLOOD PRESSURE: 137 MMHG | HEART RATE: 66 BPM | RESPIRATION RATE: 16 BRPM | TEMPERATURE: 98 F | OXYGEN SATURATION: 95 % | DIASTOLIC BLOOD PRESSURE: 93 MMHG

## 2017-10-13 PROCEDURE — 3331090002 HH PPS REVENUE DEBIT

## 2017-10-13 PROCEDURE — 3331090001 HH PPS REVENUE CREDIT

## 2017-10-14 PROCEDURE — 3331090002 HH PPS REVENUE DEBIT

## 2017-10-14 PROCEDURE — 3331090001 HH PPS REVENUE CREDIT

## 2017-10-15 PROCEDURE — 3331090001 HH PPS REVENUE CREDIT

## 2017-10-15 PROCEDURE — 3331090002 HH PPS REVENUE DEBIT

## 2017-10-16 PROCEDURE — 3331090002 HH PPS REVENUE DEBIT

## 2017-10-16 PROCEDURE — 3331090001 HH PPS REVENUE CREDIT

## 2017-10-17 ENCOUNTER — HOME CARE VISIT (OUTPATIENT)
Dept: SCHEDULING | Facility: HOME HEALTH | Age: 82
End: 2017-10-17
Payer: MEDICARE

## 2017-10-17 PROCEDURE — 3331090002 HH PPS REVENUE DEBIT

## 2017-10-17 PROCEDURE — G0299 HHS/HOSPICE OF RN EA 15 MIN: HCPCS

## 2017-10-17 PROCEDURE — 3331090001 HH PPS REVENUE CREDIT

## 2017-10-18 PROCEDURE — 3331090002 HH PPS REVENUE DEBIT

## 2017-10-18 PROCEDURE — 3331090001 HH PPS REVENUE CREDIT

## 2017-10-19 VITALS
OXYGEN SATURATION: 98 % | DIASTOLIC BLOOD PRESSURE: 87 MMHG | WEIGHT: 186 LBS | TEMPERATURE: 98.2 F | BODY MASS INDEX: 29.13 KG/M2 | RESPIRATION RATE: 14 BRPM | HEART RATE: 88 BPM | SYSTOLIC BLOOD PRESSURE: 147 MMHG

## 2017-10-19 PROCEDURE — 3331090002 HH PPS REVENUE DEBIT

## 2017-10-19 PROCEDURE — 3331090001 HH PPS REVENUE CREDIT

## 2017-10-24 ENCOUNTER — HOSPITAL ENCOUNTER (OUTPATIENT)
Dept: CT IMAGING | Age: 82
Discharge: HOME | End: 2017-10-24
Attending: SURGERY
Payer: MEDICARE

## 2017-10-24 DIAGNOSIS — I65.21 CAROTID STENOSIS, RIGHT: ICD-10-CM

## 2017-10-24 LAB — CREAT UR-MCNC: 0.8 MG/DL (ref 0.6–1.3)

## 2017-10-24 PROCEDURE — 74011636320 HC RX REV CODE- 636/320: Performed by: SURGERY

## 2017-10-24 PROCEDURE — 70498 CT ANGIOGRAPHY NECK: CPT

## 2017-10-24 PROCEDURE — 82565 ASSAY OF CREATININE: CPT

## 2017-10-24 RX ADMIN — IOPAMIDOL 100 ML: 755 INJECTION, SOLUTION INTRAVENOUS at 10:19

## 2017-10-31 ENCOUNTER — OFFICE VISIT (OUTPATIENT)
Dept: VASCULAR SURGERY | Age: 82
End: 2017-10-31

## 2017-10-31 VITALS
RESPIRATION RATE: 18 BRPM | WEIGHT: 186 LBS | HEIGHT: 67 IN | HEART RATE: 68 BPM | DIASTOLIC BLOOD PRESSURE: 72 MMHG | BODY MASS INDEX: 29.19 KG/M2 | SYSTOLIC BLOOD PRESSURE: 132 MMHG

## 2017-10-31 DIAGNOSIS — I65.21 CAROTID STENOSIS, RIGHT: Primary | ICD-10-CM

## 2017-10-31 DIAGNOSIS — I25.10 CORONARY ARTERY DISEASE INVOLVING NATIVE HEART WITHOUT ANGINA PECTORIS, UNSPECIFIED VESSEL OR LESION TYPE: ICD-10-CM

## 2017-10-31 DIAGNOSIS — E11.9 TYPE 2 DIABETES MELLITUS WITHOUT COMPLICATION, UNSPECIFIED LONG TERM INSULIN USE STATUS: ICD-10-CM

## 2017-10-31 NOTE — MR AVS SNAPSHOT
Visit Information Date & Time Provider Department Dept. Phone Encounter #  
 10/31/2017  9:30 AM Shelbi Thomason MD [facility] [address] [id number] Your Appointments 5/1/2018  9:00 AM  
Follow Up with Shelbi Thomason MD  
[facility] ([address]) Appt Note: 6 month FU with studies [address] [place] [address] Upcoming Health Maintenance Date Due  
 FOOT EXAM Q1 11/19/1945 MICROALBUMIN Q1 11/19/1945 EYE EXAM RETINAL OR DILATED Q1 11/19/1947 DTaP/Tdap/Td series (1 - Tdap) 11/19/1958 ZOSTER VACCINE AGE 60> 9/19/1997 GLAUCOMA SCREENING Q2Y 11/19/2002 OSTEOPOROSIS SCREENING (DEXA) 11/19/2002 MEDICARE YEARLY EXAM 11/19/2002 LIPID PANEL Q1 6/6/2014 INFLUENZA AGE 9 TO ADULT 8/1/2017 Pneumococcal 65+ Low/Medium Risk (2 of 2 - PPSV23) 9/1/2017 HEMOGLOBIN A1C Q6M 3/21/2018 Allergies as of 10/31/2017  Review Complete On: 10/31/2017 By: Gerard Peterson RN Severity Noted Reaction Type Reactions Adhesive  05/14/2013   Topical Rash Demerol [Meperidine]  10/03/2011    Shortness of Breath Pt has had fentanyl previously Current Immunizations  Reviewed on 3/1/2013 Name Date Influenza Vaccine 9/1/2012 Pneumococcal Vaccine (Unspecified Type) 9/1/2012 Not reviewed this visit You Were Diagnosed With   
  
 Codes Comments Carotid stenosis, right    -  Primary ICD-10-CM: U35.32 ICD-9-CM: 433.10 Vitals BP Pulse Resp Height(growth percentile) Weight(growth percentile) BMI  
 132/72 (BP 1 Location: Right arm, BP Patient Position: Sitting) 68 18 5' 7\" (1.702 m) 186 lb (84.4 kg) 29.13 kg/m2 OB Status Smoking Status Postmenopausal Former Smoker BMI and BSA Data Body Mass Index Body Surface Area  
 29.13 kg/m 2 2 m 2 Preferred [facility] [address] [phone] [phone] Dahlia Mccall [phone] Your Updated Medication List  
  
   
This list is accurate as of: 10/31/17  9:55 AM.  Always use your most recent med list.  
  
  
  
  
 acetaminophen 500 mg Cap Take 2 Tabs by mouth every four (4) hours as needed (pain). APIDRA SOLOSTAR 100 unit/mL pen Generic drug:  insulin glulisine 15 Units by SubCUTAneous route Before breakfast, lunch, and dinner. atorvastatin 40 mg tablet Commonly known as:  LIPITOR Take 40 mg by mouth daily. bimatoprost 0.01 % ophthalmic drops Commonly known as:  LUMIGAN Administer 1 Drop to right eye nightly. brimonidine-timolol 0.2-0.5 % Drop ophthalmic solution Commonly known as:  COMBIGAN Administer 1 Drop to right eye every twelve (12) hours. CENTRUM SILVER Tab tablet Generic drug:  multivitamins-minerals-lutein Take 1 Tab by mouth daily. cetirizine 10 mg tablet Commonly known as:  ZYRTEC Take 10 mg by mouth daily. ECOTRIN LOW STRENGTH 81 mg tablet Generic drug:  aspirin delayed-release Take 81 mg by mouth daily. gabapentin 300 mg capsule Commonly known as:  NEURONTIN Take 1 capsule by mouth nightly. HYDROcodone-acetaminophen  mg tablet Commonly known as:  Lila Arts Take 1 tablet by mouth every six (6) hours as needed for Pain. insulin lispro 100 unit/mL injection Commonly known as:  HUMALOG  
8 Units by SubCUTAneous route Before breakfast, lunch, and dinner. isosorbide mononitrate ER 30 mg tablet Commonly known as:  IMDUR Take 1 Tab by mouth every morning. LANTUS 100 unit/mL injection Generic drug:  insulin glargine 22 Units by SubCUTAneous route nightly. LASIX 40 mg tablet Generic drug:  furosemide Take 40 mg by mouth daily. levothyroxine 125 mcg tablet Commonly known as:  SYNTHROID  
 Take 125 mcg by mouth Daily (before breakfast). lidocaine 5 % Commonly known as:  Terry Net Apply patch to the affected area for 12 hours a day and remove for 12 hours a day. meclizine 12.5 mg tablet Commonly known as:  ANTIVERT Take 12.5 mg by mouth three (3) times daily as needed for Dizziness. metOLazone 5 mg tablet Commonly known as:  Yara Ripper Take 5 mg by mouth as needed (every other day as needed for edema). metoprolol succinate 100 mg tablet Commonly known as:  TOPROL XL Take 1 Tab by mouth daily. nitroglycerin 0.4 mg SL tablet Commonly known as:  NITROSTAT  
0.4 mg by SubLINGual route every five (5) minutes as needed for Chest Pain.  
  
 potassium chloride 20 mEq packet Commonly known as:  KLOR-CON Take 20 mEq by mouth two (2) times a day. ranolazine ER 1,000 mg  
Commonly known as:  RANEXA Take 1 Tab by mouth two (2) times a day. tobramycin 0.3 % ophthalmic solution Commonly known as:  Nigel Livingston Administer 1 Drop to both eyes two (2) times daily as needed (irritation). VITAMIN D3 5,000 unit Tab tablet Generic drug:  cholecalciferol (VITAMIN D3) Take 5,000 Units by mouth daily. To-Do List   
 04/28/2018 Imaging:  DUPLEX CAROTID BILATERAL Introducing [facility]! Dear Kiki Denver: Thank you for requesting a [employer] account. Our records indicate that you already have an active [employer] account. You can access your account anytime at [email or web address] Did you know that you can access your hospital and ER discharge instructions at any time in [employer]? You can also review all of your test results from your hospital stay or ER visit. Additional Information If you have questions, please visit the Frequently Asked Questions section of the [employer] website at [email or web address] Remember, [employer] is NOT to be used for urgent needs.  For medical emergencies, dial 911. Now available from your iPhone and Android! Please provide this summary of care documentation to your next provider. Your primary care clinician is listed as Valerie Pritchard. If you have any questions after today's visit, please call [phone].

## 2017-10-31 NOTE — PROGRESS NOTES
[facility]    Chief Complaint   Patient presents with    Carotid Artery Stenosis       History and Physical    Ms. Raquel Donohue presents to our office today for follow up after being evaluated in the hospital for asymptomatic carotid stenosis. Ms. Raquel Donohue was seen in the hospital for syncope and during her work up a carotid duplex was obtained that demonstrated mid right carotid stenosis. As this is not a usual location for atherosclerotic plaque narrowing, a CTA was obtained prior to her evaluation. Since her discharge, Ms. Raquel Donohue denies any hemispheric symptoms. She specifically denies any aphasia, focal weakness or paresthesias, and denies any sudden vision loss. Past Medical History:   Diagnosis Date    CAD (coronary artery disease)     Diabetes ([facility]     insulin dependent    Essential hypertension     Hypercholesterolemia     Menopause     Other and unspecified hyperlipidemia 1/9/2013    Thyroid disease      Patient Active Problem List   Diagnosis Code    Coronary atherosclerosis of native coronary artery I25.10    DM (diabetes mellitus) ([facility] E11.9    S/P CABG (coronary artery bypass graft) Z95.1    Chest pain, unspecified R07.9    HTN (hypertension), benign I10    Other and unspecified hyperlipidemia E78.5    A-fib ([address] I48.91    Sick sinus syndrome ([address] I49.5    Cardiac pacemaker in situ Z95.0    Pubic bone fracture (HCC) S32.509A    Ileus ([address] K56.7    Elevated troponin R74.8    Fall W19. [place] Jay    Acute chest pain R07.9    Chest pain R07.9    Rib fracture S22.39XA    Carotid stenosis, right I65.21     Past Surgical History:   Procedure Laterality Date    BIOPSY BREAST      about 12 years ago    CHEST SURGERY PROCEDURE UNLISTED  2011    triple bypass    HX CORONARY ARTERY BYPASS GRAFT      HX HEART CATHETERIZATION      HX MOHS PROCEDURES  2002    right shoulder     LAMINECTOMY,LUMBAR  2001    3 disc - Dr. Jaky Nagel Dispense Refill    atorvastatin (LIPITOR) 40 mg tablet Take 40 mg by mouth daily.  cholecalciferol, VITAMIN D3, (VITAMIN D3) 5,000 unit tab tablet Take 5,000 Units by mouth daily.  cetirizine (ZYRTEC) 10 mg tablet Take 10 mg by mouth daily.  brimonidine-timolol (COMBIGAN) 0.2-0.5 % drop ophthalmic solution Administer 1 Drop to right eye every twelve (12) hours.  bimatoprost (LUMIGAN) 0.01 % ophthalmic drops Administer 1 Drop to right eye nightly.  tobramycin (TOBREX) 0.3 % ophthalmic solution Administer 1 Drop to both eyes two (2) times daily as needed (irritation).  nitroglycerin (NITROSTAT) 0.4 mg SL tablet 0.4 mg by SubLINGual route every five (5) minutes as needed for Chest Pain.  acetaminophen 500 mg cap Take 2 Tabs by mouth every four (4) hours as needed (pain).  insulin glulisine (APIDRA SOLOSTAR) 100 unit/mL pen 15 Units by SubCUTAneous route Before breakfast, lunch, and dinner.  lidocaine (LIDODERM) 5 % Apply patch to the affected area for 12 hours a day and remove for 12 hours a day. 15 Each 0    potassium chloride (KLOR-CON) 20 mEq packet Take 20 mEq by mouth two (2) times a day.  metOLazone (ZAROXOLYN) 5 mg tablet Take 5 mg by mouth as needed (every other day as needed for edema).  meclizine (ANTIVERT) 12.5 mg tablet Take 12.5 mg by mouth three (3) times daily as needed for Dizziness.  gabapentin (NEURONTIN) 300 mg capsule Take 1 capsule by mouth nightly. 30 capsule 1    insulin lispro (HUMALOG) 100 unit/mL injection 8 Units by SubCUTAneous route Before breakfast, lunch, and dinner. 1 vial 1    hydrocodone-acetaminophen (NORCO)  mg tablet Take 1 tablet by mouth every six (6) hours as needed for Pain. 20 tablet 0    isosorbide mononitrate ER (IMDUR) 30 mg tablet Take 1 Tab by mouth every morning. 90 Tab 3    Ranolazine (RANEXA) 1,000 mg Tb12 Take 1 Tab by mouth two (2) times a day.  180 Tab 3    levothyroxine (SYNTHROID) 125 mcg tablet Take 125 mcg by mouth Daily (before breakfast).  metoprolol succinate (TOPROL XL) 100 mg XL tablet Take 1 Tab by mouth daily. 90 Tab 3    furosemide (LASIX) 40 mg tablet Take 40 mg by mouth daily.  multivitamins-minerals-lutein (CENTRUM SILVER) Tab Take 1 Tab by mouth daily.  insulin glargine (LANTUS) 100 unit/mL injection 22 Units by SubCUTAneous route nightly.  aspirin delayed-release (ECOTRIN LOW STRENGTH) 81 mg tablet Take 81 mg by mouth daily. Allergies   Allergen Reactions    Adhesive Rash    Demerol [Meperidine] Shortness of Breath     Pt has had fentanyl previously     Social History     Social History    Marital status:      Spouse name: N/A    Number of children: N/A    Years of education: N/A     Occupational History    Not on file. Social History Main Topics    Smoking status: Former Smoker     Quit date: 10/3/1979    Smokeless tobacco: Never Used    Alcohol use Yes      Comment: maybe twice a month she has wine (red)    Drug use: No    Sexual activity: Not on file     Other Topics Concern    Not on file     Social History Narrative      Family History   Problem Relation Age of Onset    Heart Attack Mother        Review of Systems    Review of Systems   Constitutional: Negative for chills, diaphoresis, fever, malaise/fatigue and weight loss. HENT: Negative for hearing loss and sore throat. Eyes: Negative for blurred vision, photophobia and redness. Respiratory: Negative for cough, hemoptysis, shortness of breath and wheezing. Cardiovascular: Negative for chest pain, palpitations and orthopnea. Gastrointestinal: Negative for abdominal pain, blood in stool, constipation, diarrhea, heartburn, nausea and vomiting. Genitourinary: Negative for dysuria, frequency, hematuria and urgency. Musculoskeletal: Positive for joint pain. Negative for back pain and myalgias. Skin: Negative for itching and rash.    Neurological: Negative for dizziness, speech change, focal weakness, weakness and headaches. Endo/Heme/Allergies: Does not bruise/bleed easily. Psychiatric/Behavioral: Negative for depression and suicidal ideas. Physical Exam:    Visit Vitals    /72 (BP 1 Location: Right arm, BP Patient Position: Sitting)    Pulse 68    Resp 18    Ht 5' 7\" (1.702 m)    Wt 186 lb (84.4 kg)    BMI 29.13 kg/m2      Physical Examination: General appearance - alert, well appearing, and in no distress  Mental status - alert, oriented to person, place, and time  Eyes - sclera anicteric, left eye normal, right eye normal  Ears - right ear normal, left ear normal  Nose - normal and patent, no erythema, discharge or polyps  Mouth - mucous membranes moist, pharynx normal without lesions  Neck - supple, no significant adenopathy, no bruit appreciated  Neurological - alert, oriented, normal speech, no focal findings or movement disorder noted      Impression and Plan:    ICD-10-CM ICD-9-CM    1. Carotid stenosis, right I65.21 433.10 DUPLEX CAROTID BILATERAL   2. Coronary artery disease involving native heart without angina pectoris, unspecified vessel or lesion type I25.10 414.01    3. Type 2 diabetes mellitus without complication, unspecified long term insulin use status ([facility] E11.9 250.00      Orders Placed This Encounter    DUPLEX CAROTID BILATERAL     I reviewed Ms. Keene's CTA results with her. The CTA has confirmed approximately 77% stenosis by radiology measurements. I think this may somewhat of an overestimation given that degree of plaque at her carotid bulb that causes some artifact. I believe her stenosis is less than 70% in agreement with her carotid duplex. As such, I do not believe surgery is indicated at this time and Ms. Delaney Dacosta will continue with antiplatelet therapy and a statin daily. We will see her again in 6 months for a repeat carotid duplex study.      Follow-up Disposition:  Return in about 6 months (around 4/30/2018) for Vascular labs. The treatment plan was reviewed with the patient in detail. The patient voiced understanding of this plan and all questions and concerns were addressed. The patient agrees with this plan. We discussed the signs and symptoms that would require earlier attention or intervention. The patient was given educational material related to his/her visit and the patient has voiced understanding of the material.     I appreciate the opportunity to participate in the care of your patient. I will be sure to keep you informed of any subsequent changes in the treatment plan. If you have any questions or concerns, please feel free to contact me. Bell Brandt MD    PLEASE NOTE:  This document has been produced using voice recognition software. Unrecognized errors in transcription may be present.

## 2017-11-06 ENCOUNTER — APPOINTMENT (OUTPATIENT)
Dept: GENERAL RADIOLOGY | Age: 82
End: 2017-11-06
Attending: EMERGENCY MEDICINE
Payer: MEDICARE

## 2017-11-06 ENCOUNTER — HOSPITAL ENCOUNTER (EMERGENCY)
Age: 82
Discharge: HOME | End: 2017-11-06
Attending: EMERGENCY MEDICINE
Payer: MEDICARE

## 2017-11-06 VITALS
BODY MASS INDEX: 28.72 KG/M2 | RESPIRATION RATE: 22 BRPM | DIASTOLIC BLOOD PRESSURE: 72 MMHG | HEART RATE: 59 BPM | SYSTOLIC BLOOD PRESSURE: 164 MMHG | TEMPERATURE: 98.2 F | WEIGHT: 183 LBS | HEIGHT: 67 IN | OXYGEN SATURATION: 97 %

## 2017-11-06 DIAGNOSIS — S42.202A CLOSED FRACTURE OF PROXIMAL END OF LEFT HUMERUS, UNSPECIFIED FRACTURE MORPHOLOGY, INITIAL ENCOUNTER: Primary | ICD-10-CM

## 2017-11-06 PROCEDURE — 73030 X-RAY EXAM OF SHOULDER: CPT

## 2017-11-06 PROCEDURE — 74011250636 HC RX REV CODE- 250/636: Performed by: EMERGENCY MEDICINE

## 2017-11-06 PROCEDURE — 74011250637 HC RX REV CODE- 250/637: Performed by: EMERGENCY MEDICINE

## 2017-11-06 PROCEDURE — L3650 SO 8 ABD RESTRAINT PRE OTS: HCPCS

## 2017-11-06 PROCEDURE — 99284 EMERGENCY DEPT VISIT MOD MDM: CPT

## 2017-11-06 PROCEDURE — 96374 THER/PROPH/DIAG INJ IV PUSH: CPT

## 2017-11-06 RX ORDER — BUTORPHANOL TARTRATE 2 MG/ML
1 INJECTION INTRAMUSCULAR; INTRAVENOUS
Status: COMPLETED | OUTPATIENT
Start: 2017-11-06 | End: 2017-11-06

## 2017-11-06 RX ORDER — HYDROMORPHONE HYDROCHLORIDE 2 MG/1
2 TABLET ORAL
Status: DISCONTINUED | OUTPATIENT
Start: 2017-11-06 | End: 2017-11-06 | Stop reason: HOSPADM

## 2017-11-06 RX ORDER — ONDANSETRON 4 MG/1
4 TABLET, ORALLY DISINTEGRATING ORAL
Status: COMPLETED | OUTPATIENT
Start: 2017-11-06 | End: 2017-11-06

## 2017-11-06 RX ORDER — OXYCODONE AND ACETAMINOPHEN 7.5; 325 MG/1; MG/1
1 TABLET ORAL
Qty: 20 TAB | Refills: 0 | Status: SHIPPED | OUTPATIENT
Start: 2017-11-06

## 2017-11-06 RX ORDER — ONDANSETRON 4 MG/1
4 TABLET, ORALLY DISINTEGRATING ORAL
Qty: 6 TAB | Refills: 0 | Status: SHIPPED | OUTPATIENT
Start: 2017-11-06

## 2017-11-06 RX ADMIN — ONDANSETRON 4 MG: 4 TABLET, ORALLY DISINTEGRATING ORAL at 06:03

## 2017-11-06 RX ADMIN — HYDROMORPHONE HYDROCHLORIDE 2 MG: 2 TABLET ORAL at 06:43

## 2017-11-06 RX ADMIN — BUTORPHANOL TARTRATE 1 MG: 2 INJECTION, SOLUTION INTRAMUSCULAR; INTRAVENOUS at 04:53

## 2017-11-06 NOTE — ED PROVIDER NOTES
[address]  EMERGENCY DEPARTMENT HISTORY AND PHYSICAL EXAM       Date: 11/6/2017   Patient Name: Hector Yu   YOB: 1935  Medical Record Number: [id number]    History of Presenting Illness     Chief Complaint   Patient presents with    Shoulder Injury        History Provided By:  patient    Additional History:   3:29 AM    Hector Yu is a 80 y.o. female with PMHx of DM, HTN, HLD, CAD and PSHx pacemaker and triple bypass presenting ambulatory to the ED c/o left shoulder pain s/p fall, onset yesterday. Notes no other associated symptoms. Pt states that she fell against her dresser. Pt specifically denies any LOC, or any other sxs or complaints at this time. Primary Care Provider: Julio Brennan MD   Specialist:    Past History     Past Medical History:   Past Medical History:   Diagnosis Date    CAD (coronary artery disease)     Diabetes ([address]     insulin dependent    Essential hypertension     Hypercholesterolemia     Menopause     Other and unspecified hyperlipidemia 1/9/2013    Thyroid disease         Past Surgical History:   Past Surgical History:   Procedure Laterality Date    BIOPSY BREAST      about 12 years ago    CHEST SURGERY PROCEDURE UNLISTED  2011    triple bypass    HX CORONARY ARTERY BYPASS GRAFT      HX HEART CATHETERIZATION      HX MOHS PROCEDURES  2002    right shoulder     HX OTHER SURGICAL      pacemaker   Suki Canas  2001    3 disc - Dr. Eric Buitrago        Family History:   Family History   Problem Relation Age of Onset    Heart Attack Mother         Social History:   Social History   Substance Use Topics    Smoking status: Former Smoker     Quit date: 10/3/1979    Smokeless tobacco: Never Used    Alcohol use Yes      Comment: maybe twice a month she has wine (red)        Allergies:    Allergies   Allergen Reactions    Adhesive Rash    Demerol [Meperidine] Shortness of Breath     Pt has had fentanyl previously        Review of Systems   Review of Systems   Constitutional: Negative for chills and fever. Musculoskeletal: Positive for arthralgias (left shoulder). Neurological: Negative for syncope. All other systems reviewed and are negative. Physical Exam  Vitals:    11/06/17 0330 11/06/17 0500   BP: 183/80 164/72   Pulse: 80 (!) 59   Resp: 18 22   Temp: 98.2 °F (36.8 °C)    SpO2: 98% 97%   Weight: 83 kg (183 lb)    Height: 5' 7\" (1.702 m)        Physical Exam   Constitutional: She is oriented to person, place, and time. She appears well-developed and well-nourished. No distress. Elderly female   HENT:   Head: Normocephalic and atraumatic. Eyes: Pupils are equal, round, and reactive to light. Neck: Neck supple. Cardiovascular: Normal rate, regular rhythm, S1 normal, S2 normal and normal heart sounds. Pulmonary/Chest: Breath sounds normal. No respiratory distress. She has no wheezes. She has no rales. She exhibits no tenderness. Abdominal: Soft. She exhibits no distension and no mass. There is no tenderness. There is no guarding. Musculoskeletal: She exhibits no edema. Left shoulder: She exhibits decreased range of motion and tenderness. TTP left shoulder, no bruising, neurovascularly intact. Neurological: She is alert and oriented to person, place, and time. No cranial nerve deficit. Skin: No rash noted. Palpable device on left upper chest.   Psychiatric: She has a normal mood and affect. Her behavior is normal. Thought content normal.   Nursing note and vitals reviewed. Diagnostic Study Results     Labs -    No results found for this or any previous visit (from the past 12 hour(s)).     Radiologic Studies -   XR SHOULDER LT AP/LAT MIN 2 V    (Results Pending)      6:34 AM  RADIOLOGY FINDINGS  Left humerus X-ray shows proximal left humerus fracture  Pending review by Radiologist  Recorded by STEPHANY Carrasquillo, as dictated by Charly Murillo MD    Medical Decision Making   I am the first provider for this patient. I reviewed the vital signs, available nursing notes, past medical history, past surgical history, family history and social history. Vital Signs-Reviewed the patient's vital signs. Patient Vitals for the past 12 hrs:   Temp Pulse Resp BP SpO2   11/06/17 0500 - (!) 59 22 164/72 97 %   11/06/17 0330 98.2 °F (36.8 °C) 80 18 183/80 98 %       Pulse Oximetry Analysis - Normal 98% on RA. No intervention needed. Old Medical Records: Nursing notes. Procedures: Other Procedure  Date/Time: 11/6/2017 6:41 AM  Performed by: Graham Barlow  Authorized by: Graham Barlow     Consent:     Consent obtained:  Verbal    Consent given by:  Patient    Risks discussed:  Pain    Alternatives discussed:  No treatment  Anesthesia (see MAR for exact dosages): Anesthesia method:  None  Post-procedure details:     Patient tolerance of procedure: Tolerated well, no immediate complications  Comments:      Shoulder immobilizer was place. Right arm was neurovascularly intact after she was immobilized. Pt is left handed. ED Course:     3:29 AM   Initial assessment performed. The patients presenting problems have been discussed, and they are in agreement with the care plan formulated and outlined with them. I have encouraged them to ask questions as they arise throughout their visit. CONSULT NOTE:   6:58 AM  Lanie Duffy MD spoke with Sofya Jamison MD   Specialty: Orthopedics  Discussed pt's hx, disposition, and available diagnostic and imaging results. Reviewed care plans. Consulting physician agrees with plans as outlined. He will see the pt.    Written by Mary Jane Hernandez ED Scribe, as dictated by Lanie Duffy MD.    Medications Given in the ED:  Medications   HYDROmorphone (DILAUDID) tablet 2 mg (2 mg Oral Given 11/6/17 9122)   butorphanol (STADOL) injection 1 mg (1 mg IntraVENous Given 11/6/17 7431)   ondansetron (ZOFRAN ODT) tablet 4 mg (4 mg SubLINGual Given 11/6/17 0603)        Discharge Note:  6:34 AM  Patients results have been reviewed with them. Patient and/or family have verbally conveyed their understanding and agreement of the patient's signs, symptoms, diagnosis, treatment and prognosis and additionally agree to follow up as recommended or return to the Emergency Room should their condition change prior to their follow-up appointment. Patient verbally agrees with the care-plan and verbally conveys that all of their questions have been answered. Discharge instructions have also been provided to the patient with some educational information regarding their diagnosis as well a list of reasons why they would want to return to the ER prior to their follow-up appointment should their condition change. Diagnosis   Clinical Impression:   1. Closed fracture of proximal end of left humerus, unspecified fracture morphology, initial encounter         Follow-up Information     Follow up With Details Comments Contact Info    Ramesh Samuel MD Schedule an appointment as soon as possible for a visit in 2 days for ortho follow up [address]      [facility] EMERGENCY DEPT  As needed, If symptoms worsen [address] [place]  [phone]          Discharge Medication List as of 11/6/2017  6:34 AM      START taking these medications    Details   oxyCODONE-acetaminophen (PERCOCET) 7.5-325 mg per tablet Take 1 Tab by mouth every six (6) hours as needed for Pain. Max Daily Amount: 4 Tabs., Print, Disp-20 Tab, R-0      ondansetron (ZOFRAN ODT) 4 mg disintegrating tablet Take 1 Tab by mouth every eight (8) hours as needed for Nausea., Normal, Disp-6 Tab, R-0         CONTINUE these medications which have NOT CHANGED    Details   atorvastatin (LIPITOR) 40 mg tablet Take 40 mg by mouth daily. , Historical Med      cholecalciferol, VITAMIN D3, (VITAMIN D3) 5,000 unit tab tablet Take 5,000 Units by mouth daily. , Historical Med      cetirizine (ZYRTEC) 10 mg tablet Take 10 mg by mouth daily. , Historical Med      brimonidine-timolol (COMBIGAN) 0.2-0.5 % drop ophthalmic solution Administer 1 Drop to right eye every twelve (12) hours. , Historical Med      bimatoprost (LUMIGAN) 0.01 % ophthalmic drops Administer 1 Drop to right eye nightly., Historical Med      tobramycin (TOBREX) 0.3 % ophthalmic solution Administer 1 Drop to both eyes two (2) times daily as needed (irritation). , Historical Med      insulin glulisine (APIDRA SOLOSTAR) 100 unit/mL pen 15 Units by SubCUTAneous route Before breakfast, lunch, and dinner., Historical Med      potassium chloride (KLOR-CON) 20 mEq packet Take 20 mEq by mouth two (2) times a day., Historical Med      metOLazone (ZAROXOLYN) 5 mg tablet Take 5 mg by mouth as needed (every other day as needed for edema). , Historical Med      isosorbide mononitrate ER (IMDUR) 30 mg tablet Take 1 Tab by mouth every morning., Print, Disp-90 Tab, R-3      levothyroxine (SYNTHROID) 125 mcg tablet Take 125 mcg by mouth Daily (before breakfast). , Historical Med      furosemide (LASIX) 40 mg tablet Take 40 mg by mouth daily. , Historical Med      multivitamins-minerals-lutein (CENTRUM SILVER) Tab Take 1 Tab by mouth daily. Historical Med, 1 Tab      insulin glargine (LANTUS) 100 unit/mL injection 22 Units by SubCUTAneous route nightly., Historical Med      aspirin delayed-release (ECOTRIN LOW STRENGTH) 81 mg tablet Take 81 mg by mouth daily. Historical Med, 81 mg      nitroglycerin (NITROSTAT) 0.4 mg SL tablet 0.4 mg by SubLINGual route every five (5) minutes as needed for Chest Pain., Historical Med      acetaminophen 500 mg cap Take 2 Tabs by mouth every four (4) hours as needed (pain). , Historical Med      lidocaine (LIDODERM) 5 % Apply patch to the affected area for 12 hours a day and remove for 12 hours a day., Normal, Disp-15 Each, R-0 meclizine (ANTIVERT) 12.5 mg tablet Take 12.5 mg by mouth three (3) times daily as needed for Dizziness. , Historical Med      metoprolol succinate (TOPROL XL) 100 mg XL tablet Take 1 Tab by mouth daily. , Print, Disp-90 Tab, R-3             _______________________________   Attestations:     SCRIBE ATTESTATION:  This note is prepared by Brit León, acting as Scribe for Omar Tamayo MD.    PROVIDER ATTESTATION:  Omar Tamayo MD: The scribe's documentation has been prepared under my direction and personally reviewed by me in its entirety.  I confirm that the note above accurately reflects all work, treatment, procedures, and medical decision making performed by me.   _______________________________

## 2017-11-06 NOTE — DISCHARGE INSTRUCTIONS
Broken Arm: Care Instructions  Your Care Instructions  Fractures can range from a small, hairline crack, to a bone or bones broken into two or more pieces. Your treatment depends on how bad the break is. Your doctor may have put your arm in a splint or cast to allow it to heal or to keep it stable until you see another doctor. It may take weeks or months for your arm to heal. You can help your arm heal with some care at home. You heal best when you take good care of yourself. Eat a variety of healthy foods, and don't smoke. You may have had a sedative to help you relax. You may be unsteady after having sedation. It can take a few hours for the medicine's effects to wear off. Common side effects of sedation include nausea, vomiting, and feeling sleepy or tired. The doctor has checked you carefully, but problems can develop later. If you notice any problems or new symptoms, get medical treatment right away. Follow-up care is a key part of your treatment and safety. Be sure to make and go to all appointments, and call your doctor if you are having problems. It's also a good idea to know your test results and keep a list of the medicines you take. How can you care for yourself at home? · If the doctor gave you a sedative:  ¨ For 24 hours, don't do anything that requires attention to detail. It takes time for the medicine's effects to completely wear off. ¨ For your safety, do not drive or operate any machinery that could be dangerous. Wait until the medicine wears off and you can think clearly and react easily. · Put ice or a cold pack on your arm for 10 to 20 minutes at a time. Try to do this every 1 to 2 hours for the next 3 days (when you are awake). Put a thin cloth between the ice and your cast or splint. Keep the cast or splint dry. · Follow the cast care instructions your doctor gives you. If you have a splint, do not take it off unless your doctor tells you to. · Be safe with medicines.  Take pain medicines exactly as directed. ¨ If the doctor gave you a prescription medicine for pain, take it as prescribed. ¨ If you are not taking a prescription pain medicine, ask your doctor if you can take an over-the-counter medicine. · Prop up your arm on pillows when you sit or lie down in the first few days after the injury. Keep the arm higher than the level of your heart. This will help reduce swelling. · Follow instructions for exercises to keep your arm strong. · Wiggle your fingers and wrist often to reduce swelling and stiffness. When should you call for help? Call 911 anytime you think you may need emergency care. For example, call if:  ? · You are very sleepy and you have trouble waking up. ?Call your doctor now or seek immediate medical care if:  ? · You have new or worse nausea or vomiting. ? · You have new or worse pain. ? · Your hand or fingers are cool or pale or change color. ? · Your cast or splint feels too tight. ? · You have tingling, weakness, or numbness in your hand or fingers. ? Watch closely for changes in your health, and be sure to contact your doctor if:  ? · You do not get better as expected. ? · You have problems with your cast or splint. Where can you learn more? Go to http://doris-jonathan.info/. Enter Q810 in the search box to learn more about \"Broken Arm: Care Instructions. \"  Current as of: March 21, 2017  Content Version: 11.4  © 5140-3343 [employer]. Care instructions adapted under license by [employer] (which disclaims liability or warranty for this information). If you have questions about a medical condition or this instruction, always ask your healthcare professional. [place] [address] any warranty or liability for your use of this information.

## 2021-08-03 PROBLEM — R07.9 CHEST PAIN: Status: RESOLVED | Noted: 2017-09-20 | Resolved: 2021-08-03

## 2022-02-05 NOTE — DIABETES MGMT
Problem: Adult Inpatient Plan of Care  Goal: Plan of Care Review  2/5/2022 0547 by Connor Hernandez RN  Outcome: Ongoing, Progressing  2/5/2022 0527 by Connor Hernandez RN  Outcome: Ongoing, Progressing  Goal: Patient-Specific Goal (Individualized)  2/5/2022 0547 by Connor Hernandez RN  Outcome: Ongoing, Progressing  2/5/2022 0527 by Connor Hernandez RN  Outcome: Ongoing, Progressing  Goal: Absence of Hospital-Acquired Illness or Injury  2/5/2022 0547 by Connor Hernandez RN  Outcome: Ongoing, Progressing  2/5/2022 0527 by Connor Hernandez RN  Outcome: Ongoing, Progressing  Goal: Optimal Comfort and Wellbeing  2/5/2022 0547 by Connor Hernandez RN  Outcome: Ongoing, Progressing  2/5/2022 0527 by Connor Hernandez RN  Outcome: Ongoing, Progressing  Goal: Readiness for Transition of Care  2/5/2022 0547 by Connor Hernandez RN  Outcome: Ongoing, Progressing  2/5/2022 0527 by Connor Hernandez RN  Outcome: Ongoing, Progressing     Problem: Infection  Goal: Absence of Infection Signs and Symptoms  2/5/2022 0547 by Connor Hernandez RN  Outcome: Ongoing, Progressing  2/5/2022 0527 by Connor Hernandez RN  Outcome: Ongoing, Progressing      Diabetes Patient/Family Education Record    Factors That  May Influence Patients Ability  to Learn or  Comply With  Recommendations:    []   Language barrier    []   Cultural needs   []   Motivation    []   Cognitive limitation    []   Physical   []   Education    []   Physiological factors   []   Hearing/vision/speaking impairment   []   Faith beliefs    []   Financial factors   []  Other:   [x]  No factors identified at this time.      Person Instructed:   [x]   Patient   [x]   Family ()   []  Other     Preference for Learning:   [x]   Verbal   []   Written   []  Demonstration     Level of Comprehension & Competence:    [x]  Good                                      [] Fair                                     []  Poor                             []  Needs Reinforcement   [x]  Teachback completed    Education Component:   [x]  Medication management, including confirmation of home regimen, taking Lantus 22 units; Humalog 15 units + SSI starting at BG > 150 2 units    []  Nutritional management including the role of carbohydrate intake   []  Exercise   []  Signs, symptoms, and treatment of hyperglycemia and hypoglycemia   [] Treatment of hyperglycemia and hypoglycemia   [x]  Importance of blood glucose monitoring; SBGM 4x/day   []  Instruction on use of blood glucose meter   [x]  Discuss the importance of HbA1C monitoring and provide patient with  Results; reports last HbA1C was > 9%    []  Sick day guidelines   []  Proper use and disposal of lancets, needles, syringes or insulin pens (if appropriate)   []  Potential long-term complications (retinopathy, kidney disease, neuropathy, heart disease, stroke, vascular disease, foot care)   [] Provide emergency contact number and contact number for more information    [x]  Goal:  Patient/family will demonstrate understanding of Diabetes Self Management Skills by: (date)10/30  Plan for post-discharge education or self-management support:    [x] Outpatient class schedule provided            [] Patient Declined    [] Scheduled for outpatient classes (date) _______         Rosemary Torres RN, MS  Glycemic Control Team